# Patient Record
Sex: MALE | Race: WHITE | Employment: OTHER | URBAN - NONMETROPOLITAN AREA
[De-identification: names, ages, dates, MRNs, and addresses within clinical notes are randomized per-mention and may not be internally consistent; named-entity substitution may affect disease eponyms.]

---

## 2017-04-04 LAB
BASOPHILS ABSOLUTE: NORMAL /ΜL
BASOPHILS RELATIVE PERCENT: NORMAL %
BUN BLDV-MCNC: 22 MG/DL
CALCIUM SERPL-MCNC: 9.8 MG/DL
CHLORIDE BLD-SCNC: 103 MMOL/L
CO2: 31 MMOL/L
CREAT SERPL-MCNC: 1.4 MG/DL
EOSINOPHILS ABSOLUTE: NORMAL /ΜL
EOSINOPHILS RELATIVE PERCENT: NORMAL %
GFR CALCULATED: 51
GLUCOSE BLD-MCNC: 123 MG/DL
HCT VFR BLD CALC: 46.9 % (ref 41–53)
HEMOGLOBIN: 15.4 G/DL (ref 13.5–17.5)
LYMPHOCYTES ABSOLUTE: NORMAL /ΜL
LYMPHOCYTES RELATIVE PERCENT: NORMAL %
MCH RBC QN AUTO: NORMAL PG
MCHC RBC AUTO-ENTMCNC: NORMAL G/DL
MCV RBC AUTO: NORMAL FL
MONOCYTES ABSOLUTE: NORMAL /ΜL
MONOCYTES RELATIVE PERCENT: NORMAL %
NEUTROPHILS ABSOLUTE: NORMAL /ΜL
NEUTROPHILS RELATIVE PERCENT: NORMAL %
PLATELET # BLD: 154 K/ΜL
PMV BLD AUTO: NORMAL FL
POTASSIUM SERPL-SCNC: 4 MMOL/L
RBC # BLD: 5.03 10^6/ΜL
SODIUM BLD-SCNC: 142 MMOL/L
WBC # BLD: 5.78 10^3/ML

## 2017-04-11 ENCOUNTER — OFFICE VISIT (OUTPATIENT)
Dept: NEPHROLOGY | Age: 82
End: 2017-04-11

## 2017-04-11 VITALS
HEART RATE: 81 BPM | DIASTOLIC BLOOD PRESSURE: 68 MMHG | SYSTOLIC BLOOD PRESSURE: 128 MMHG | WEIGHT: 248 LBS | HEIGHT: 73 IN | OXYGEN SATURATION: 96 % | BODY MASS INDEX: 32.87 KG/M2 | RESPIRATION RATE: 18 BRPM

## 2017-04-11 DIAGNOSIS — N18.30 CKD (CHRONIC KIDNEY DISEASE) STAGE 3, GFR 30-59 ML/MIN (HCC): Primary | ICD-10-CM

## 2017-04-11 PROCEDURE — 4040F PNEUMOC VAC/ADMIN/RCVD: CPT | Performed by: INTERNAL MEDICINE

## 2017-04-11 PROCEDURE — 99213 OFFICE O/P EST LOW 20 MIN: CPT | Performed by: INTERNAL MEDICINE

## 2017-04-11 PROCEDURE — G8419 CALC BMI OUT NRM PARAM NOF/U: HCPCS | Performed by: INTERNAL MEDICINE

## 2017-04-11 PROCEDURE — 1036F TOBACCO NON-USER: CPT | Performed by: INTERNAL MEDICINE

## 2017-04-11 PROCEDURE — G8427 DOCREV CUR MEDS BY ELIG CLIN: HCPCS | Performed by: INTERNAL MEDICINE

## 2017-04-11 PROCEDURE — 1123F ACP DISCUSS/DSCN MKR DOCD: CPT | Performed by: INTERNAL MEDICINE

## 2017-04-11 RX ORDER — DESOXIMETASONE 2.5 MG/G
CREAM TOPICAL PRN
Refills: 1 | COMMUNITY
Start: 2017-01-24 | End: 2017-08-08 | Stop reason: ALTCHOICE

## 2017-04-11 RX ORDER — HYDROXYZINE HYDROCHLORIDE 10 MG/1
10 TABLET, FILM COATED ORAL DAILY
Refills: 5 | COMMUNITY
Start: 2017-03-20 | End: 2019-09-10 | Stop reason: ALTCHOICE

## 2017-04-11 RX ORDER — COVID-19 ANTIGEN TEST
KIT MISCELLANEOUS PRN
COMMUNITY
End: 2017-08-08 | Stop reason: ALTCHOICE

## 2017-07-31 LAB
BASOPHILS ABSOLUTE: NORMAL /ΜL
BASOPHILS RELATIVE PERCENT: NORMAL %
BUN BLDV-MCNC: 24 MG/DL
CALCIUM SERPL-MCNC: 10.2 MG/DL
CHLORIDE BLD-SCNC: 101 MMOL/L
CO2: 29 MMOL/L
CREAT SERPL-MCNC: 1.7 MG/DL
EOSINOPHILS ABSOLUTE: NORMAL /ΜL
EOSINOPHILS RELATIVE PERCENT: NORMAL %
GFR CALCULATED: 41
GLUCOSE BLD-MCNC: 143 MG/DL
HCT VFR BLD CALC: 45 % (ref 41–53)
HEMOGLOBIN: 14.8 G/DL (ref 13.5–17.5)
LYMPHOCYTES ABSOLUTE: NORMAL /ΜL
LYMPHOCYTES RELATIVE PERCENT: NORMAL %
MCH RBC QN AUTO: NORMAL PG
MCHC RBC AUTO-ENTMCNC: NORMAL G/DL
MCV RBC AUTO: NORMAL FL
MONOCYTES ABSOLUTE: NORMAL /ΜL
MONOCYTES RELATIVE PERCENT: NORMAL %
NEUTROPHILS ABSOLUTE: NORMAL /ΜL
NEUTROPHILS RELATIVE PERCENT: NORMAL %
PLATELET # BLD: 166 K/ΜL
PMV BLD AUTO: NORMAL FL
POTASSIUM SERPL-SCNC: 4.2 MMOL/L
RBC # BLD: 4.87 10^6/ΜL
SODIUM BLD-SCNC: 141 MMOL/L
WBC # BLD: 6.01 10^3/ML

## 2017-08-08 ENCOUNTER — OFFICE VISIT (OUTPATIENT)
Dept: NEPHROLOGY | Age: 82
End: 2017-08-08
Payer: MEDICARE

## 2017-08-08 VITALS
OXYGEN SATURATION: 94 % | WEIGHT: 247 LBS | HEIGHT: 73 IN | BODY MASS INDEX: 32.74 KG/M2 | DIASTOLIC BLOOD PRESSURE: 82 MMHG | HEART RATE: 82 BPM | RESPIRATION RATE: 18 BRPM | SYSTOLIC BLOOD PRESSURE: 122 MMHG

## 2017-08-08 DIAGNOSIS — N18.30 CKD (CHRONIC KIDNEY DISEASE), STAGE III (HCC): Primary | ICD-10-CM

## 2017-08-08 PROCEDURE — 1123F ACP DISCUSS/DSCN MKR DOCD: CPT | Performed by: INTERNAL MEDICINE

## 2017-08-08 PROCEDURE — G8417 CALC BMI ABV UP PARAM F/U: HCPCS | Performed by: INTERNAL MEDICINE

## 2017-08-08 PROCEDURE — G8427 DOCREV CUR MEDS BY ELIG CLIN: HCPCS | Performed by: INTERNAL MEDICINE

## 2017-08-08 PROCEDURE — 1036F TOBACCO NON-USER: CPT | Performed by: INTERNAL MEDICINE

## 2017-08-08 PROCEDURE — 99213 OFFICE O/P EST LOW 20 MIN: CPT | Performed by: INTERNAL MEDICINE

## 2017-08-08 PROCEDURE — 4040F PNEUMOC VAC/ADMIN/RCVD: CPT | Performed by: INTERNAL MEDICINE

## 2017-08-08 RX ORDER — TRAMADOL HYDROCHLORIDE 50 MG/1
TABLET ORAL
Refills: 0 | COMMUNITY
Start: 2017-06-08 | End: 2018-02-13 | Stop reason: ALTCHOICE

## 2017-10-02 LAB
BASOPHILS ABSOLUTE: NORMAL /ΜL
BASOPHILS RELATIVE PERCENT: NORMAL %
BUN BLDV-MCNC: 25 MG/DL
CALCIUM SERPL-MCNC: 9.5 MG/DL
CHLORIDE BLD-SCNC: 103 MMOL/L
CO2: 25 MMOL/L
CREAT SERPL-MCNC: 1.6 MG/DL
EOSINOPHILS ABSOLUTE: NORMAL /ΜL
EOSINOPHILS RELATIVE PERCENT: NORMAL %
GFR CALCULATED: 44
GLUCOSE BLD-MCNC: 112 MG/DL
HCT VFR BLD CALC: 46 % (ref 41–53)
HEMOGLOBIN: 15.3 G/DL (ref 13.5–17.5)
LYMPHOCYTES ABSOLUTE: NORMAL /ΜL
LYMPHOCYTES RELATIVE PERCENT: NORMAL %
MCH RBC QN AUTO: NORMAL PG
MCHC RBC AUTO-ENTMCNC: NORMAL G/DL
MCV RBC AUTO: NORMAL FL
MONOCYTES ABSOLUTE: NORMAL /ΜL
MONOCYTES RELATIVE PERCENT: NORMAL %
NEUTROPHILS ABSOLUTE: NORMAL /ΜL
NEUTROPHILS RELATIVE PERCENT: NORMAL %
PLATELET # BLD: 150 K/ΜL
PMV BLD AUTO: NORMAL FL
POTASSIUM SERPL-SCNC: 4.4 MMOL/L
RBC # BLD: 4.94 10^6/ΜL
SODIUM BLD-SCNC: 137 MMOL/L
WBC # BLD: 6.69 10^3/ML

## 2017-10-10 ENCOUNTER — OFFICE VISIT (OUTPATIENT)
Dept: NEPHROLOGY | Age: 82
End: 2017-10-10
Payer: MEDICARE

## 2017-10-10 VITALS
BODY MASS INDEX: 32.07 KG/M2 | DIASTOLIC BLOOD PRESSURE: 64 MMHG | WEIGHT: 242 LBS | HEART RATE: 96 BPM | SYSTOLIC BLOOD PRESSURE: 112 MMHG | HEIGHT: 73 IN | RESPIRATION RATE: 16 BRPM | OXYGEN SATURATION: 96 %

## 2017-10-10 DIAGNOSIS — N18.30 CKD (CHRONIC KIDNEY DISEASE), STAGE III (HCC): Primary | ICD-10-CM

## 2017-10-10 PROCEDURE — G8427 DOCREV CUR MEDS BY ELIG CLIN: HCPCS | Performed by: INTERNAL MEDICINE

## 2017-10-10 PROCEDURE — 99213 OFFICE O/P EST LOW 20 MIN: CPT | Performed by: INTERNAL MEDICINE

## 2017-10-10 PROCEDURE — 4040F PNEUMOC VAC/ADMIN/RCVD: CPT | Performed by: INTERNAL MEDICINE

## 2017-10-10 PROCEDURE — 1036F TOBACCO NON-USER: CPT | Performed by: INTERNAL MEDICINE

## 2017-10-10 PROCEDURE — G8484 FLU IMMUNIZE NO ADMIN: HCPCS | Performed by: INTERNAL MEDICINE

## 2017-10-10 PROCEDURE — G8417 CALC BMI ABV UP PARAM F/U: HCPCS | Performed by: INTERNAL MEDICINE

## 2017-10-10 PROCEDURE — 1123F ACP DISCUSS/DSCN MKR DOCD: CPT | Performed by: INTERNAL MEDICINE

## 2017-10-10 RX ORDER — TADALAFIL 10 MG/1
10 TABLET ORAL PRN
Qty: 10 TABLET | Refills: 12 | Status: SHIPPED | OUTPATIENT
Start: 2017-10-10 | End: 2018-05-08

## 2017-10-10 NOTE — PROGRESS NOTES
10/02/2017    HCT 46.0 10/02/2017     10/02/2017     BMP:    Lab Results   Component Value Date     10/02/2017     07/31/2017     04/04/2017    K 4.4 10/02/2017    K 4.2 07/31/2017    K 4.0 04/04/2017     10/02/2017     07/31/2017     04/04/2017    CO2 25 10/02/2017    CO2 29 07/31/2017    CO2 31 04/04/2017    BUN 25 10/02/2017    BUN 24 07/31/2017    BUN 22 04/04/2017    CREATININE 1.6 10/02/2017    CREATININE 1.7 07/31/2017    CREATININE 1.4 04/04/2017    GLUCOSE 112 10/02/2017    GLUCOSE 143 07/31/2017    GLUCOSE 123 04/04/2017      Hepatic:   Lab Results   Component Value Date    AST 23 09/08/2015    ALT 31 09/08/2015    BILITOT 1.4 09/08/2015    ALKPHOS 85 09/08/2015     BNP: No results found for: BNP  Lipids: No results found for: CHOL, HDL  INR: No results found for: INR  URINE: No results found for: NAUR, PROTUR  No results found for: NITRU, COLORU, PHUR, LABCAST, WBCUA, RBCUA, MUCUS, TRICHOMONAS, YEAST, BACTERIA, CLARITYU, SPECGRAV, LEUKOCYTESUR, UROBILINOGEN, BILIRUBINUR, BLOODU, GLUCOSEU, KETUA, AMORPHOUS   Microalbumen/Creatinine ratio:  No components found for: RUCREAT        Medications:    Current Outpatient Prescriptions   Medication Sig Dispense Refill    traMADol (ULTRAM) 50 MG tablet   0    hydrOXYzine (ATARAX) 10 MG tablet 10 mg daily  5    vitamin D-3 (CHOLECALCIFEROL) 5000 UNITS TABS Take 5,000 Units by mouth 10 pills a week      Multiple Vitamins-Minerals (PRESERVISION AREDS PO) Take  by mouth 2 times daily.  docusate sodium (COLACE) 100 MG capsule Take 100 mg by mouth 2 times daily. No current facility-administered medications for this visit. IMPRESSION:    Patient Active Problem List   Diagnosis Code    CKD (chronic kidney disease), stage III N18.3    Nephrotic syndrome N04.9    Proteinuria R80.9    Nephrolithiasis N20.0    Hyperlipemia E78.5    Eczema L30.9                PLAN:  1. We discussed the eGFR today.   2. We

## 2017-10-12 PROBLEM — N52.9 ERECTILE DYSFUNCTION: Status: ACTIVE | Noted: 2017-10-12

## 2017-10-19 ENCOUNTER — TELEPHONE (OUTPATIENT)
Dept: NEPHROLOGY | Age: 82
End: 2017-10-19

## 2018-01-29 LAB
BASOPHILS ABSOLUTE: NORMAL /ΜL
BASOPHILS RELATIVE PERCENT: NORMAL %
BUN BLDV-MCNC: 22 MG/DL
CALCIUM SERPL-MCNC: 9.8 MG/DL
CHLORIDE BLD-SCNC: 104 MMOL/L
CO2: 28 MMOL/L
CREAT SERPL-MCNC: 1.8 MG/DL
EOSINOPHILS ABSOLUTE: NORMAL /ΜL
EOSINOPHILS RELATIVE PERCENT: NORMAL %
GFR CALCULATED: 38
GLUCOSE BLD-MCNC: 121 MG/DL
HCT VFR BLD CALC: 44.1 % (ref 41–53)
HEMOGLOBIN: 14.4 G/DL (ref 13.5–17.5)
LYMPHOCYTES ABSOLUTE: NORMAL /ΜL
LYMPHOCYTES RELATIVE PERCENT: NORMAL %
MCH RBC QN AUTO: NORMAL PG
MCHC RBC AUTO-ENTMCNC: NORMAL G/DL
MCV RBC AUTO: NORMAL FL
MONOCYTES ABSOLUTE: NORMAL /ΜL
MONOCYTES RELATIVE PERCENT: NORMAL %
NEUTROPHILS ABSOLUTE: NORMAL /ΜL
NEUTROPHILS RELATIVE PERCENT: NORMAL %
PLATELET # BLD: 164 K/ΜL
PMV BLD AUTO: NORMAL FL
POTASSIUM SERPL-SCNC: 4.3 MMOL/L
RBC # BLD: 4.73 10^6/ΜL
SODIUM BLD-SCNC: 139 MMOL/L
WBC # BLD: 5.29 10^3/ML

## 2018-02-13 ENCOUNTER — OFFICE VISIT (OUTPATIENT)
Dept: NEPHROLOGY | Age: 83
End: 2018-02-13
Payer: MEDICARE

## 2018-02-13 VITALS
RESPIRATION RATE: 18 BRPM | DIASTOLIC BLOOD PRESSURE: 74 MMHG | HEART RATE: 83 BPM | BODY MASS INDEX: 32.74 KG/M2 | SYSTOLIC BLOOD PRESSURE: 128 MMHG | OXYGEN SATURATION: 98 % | HEIGHT: 73 IN | WEIGHT: 247 LBS

## 2018-02-13 DIAGNOSIS — N17.9 AKI (ACUTE KIDNEY INJURY) (HCC): Primary | ICD-10-CM

## 2018-02-13 DIAGNOSIS — N18.30 CKD (CHRONIC KIDNEY DISEASE), STAGE III (HCC): ICD-10-CM

## 2018-02-13 PROCEDURE — G8427 DOCREV CUR MEDS BY ELIG CLIN: HCPCS | Performed by: INTERNAL MEDICINE

## 2018-02-13 PROCEDURE — 4040F PNEUMOC VAC/ADMIN/RCVD: CPT | Performed by: INTERNAL MEDICINE

## 2018-02-13 PROCEDURE — 1123F ACP DISCUSS/DSCN MKR DOCD: CPT | Performed by: INTERNAL MEDICINE

## 2018-02-13 PROCEDURE — G8417 CALC BMI ABV UP PARAM F/U: HCPCS | Performed by: INTERNAL MEDICINE

## 2018-02-13 PROCEDURE — G8484 FLU IMMUNIZE NO ADMIN: HCPCS | Performed by: INTERNAL MEDICINE

## 2018-02-13 PROCEDURE — 1036F TOBACCO NON-USER: CPT | Performed by: INTERNAL MEDICINE

## 2018-02-13 PROCEDURE — 99213 OFFICE O/P EST LOW 20 MIN: CPT | Performed by: INTERNAL MEDICINE

## 2018-02-13 NOTE — PROGRESS NOTES
HCT 44.1 01/29/2018     01/29/2018     BMP:    Lab Results   Component Value Date     01/29/2018     10/02/2017     07/31/2017    K 4.3 01/29/2018    K 4.4 10/02/2017    K 4.2 07/31/2017     01/29/2018     10/02/2017     07/31/2017    CO2 28 01/29/2018    CO2 25 10/02/2017    CO2 29 07/31/2017    BUN 22 01/29/2018    BUN 25 10/02/2017    BUN 24 07/31/2017    CREATININE 1.8 01/29/2018    CREATININE 1.6 10/02/2017    CREATININE 1.7 07/31/2017    GLUCOSE 121 01/29/2018    GLUCOSE 112 10/02/2017    GLUCOSE 143 07/31/2017      Hepatic:   Lab Results   Component Value Date    AST 23 09/08/2015    ALT 31 09/08/2015    BILITOT 1.4 09/08/2015    ALKPHOS 85 09/08/2015     BNP: No results found for: BNP  Lipids: No results found for: CHOL, HDL  INR: No results found for: INR  URINE: No results found for: NAUR, PROTUR  No results found for: NITRU, COLORU, PHUR, LABCAST, WBCUA, RBCUA, MUCUS, TRICHOMONAS, YEAST, BACTERIA, CLARITYU, SPECGRAV, LEUKOCYTESUR, UROBILINOGEN, BILIRUBINUR, BLOODU, GLUCOSEU, KETUA, AMORPHOUS   Microalbumen/Creatinine ratio:  No components found for: RUCREAT        Medications:    Current Outpatient Prescriptions   Medication Sig Dispense Refill    Naproxen Sodium (ALEVE PO) Take by mouth as needed      tadalafil (CIALIS) 10 MG tablet Take 1 tablet by mouth as needed for Erectile Dysfunction 10 tablet 12    hydrOXYzine (ATARAX) 10 MG tablet 10 mg daily  5    vitamin D-3 (CHOLECALCIFEROL) 5000 UNITS TABS Take 5,000 Units by mouth 10 pills a week      Multiple Vitamins-Minerals (PRESERVISION AREDS PO) Take  by mouth 2 times daily.  docusate sodium (COLACE) 100 MG capsule Take 100 mg by mouth 2 times daily. No current facility-administered medications for this visit. IMPRESSIONS:      1. Acute kidney injury. 2. CKD stage IIIB from microkidney stones  3. Nephrotic syndrome         PLAN:  1. We discussed the eGFR today.   2. We will continue

## 2018-04-30 LAB
BASOPHILS ABSOLUTE: NORMAL /ΜL
BASOPHILS RELATIVE PERCENT: NORMAL %
BUN BLDV-MCNC: 30 MG/DL
CALCIUM SERPL-MCNC: 9.7 MG/DL
CHLORIDE BLD-SCNC: 104 MMOL/L
CO2: 30 MMOL/L
CREAT SERPL-MCNC: 106 MG/DL
EOSINOPHILS ABSOLUTE: NORMAL /ΜL
EOSINOPHILS RELATIVE PERCENT: NORMAL %
GFR CALCULATED: 44
GLUCOSE BLD-MCNC: 116 MG/DL
HCT VFR BLD CALC: 44.6 % (ref 41–53)
HEMOGLOBIN: 14.9 G/DL (ref 13.5–17.5)
LYMPHOCYTES ABSOLUTE: NORMAL /ΜL
LYMPHOCYTES RELATIVE PERCENT: NORMAL %
MCH RBC QN AUTO: NORMAL PG
MCHC RBC AUTO-ENTMCNC: NORMAL G/DL
MCV RBC AUTO: NORMAL FL
MONOCYTES ABSOLUTE: NORMAL /ΜL
MONOCYTES RELATIVE PERCENT: NORMAL %
NEUTROPHILS ABSOLUTE: NORMAL /ΜL
NEUTROPHILS RELATIVE PERCENT: NORMAL %
PLATELET # BLD: 152 K/ΜL
PMV BLD AUTO: NORMAL FL
POTASSIUM SERPL-SCNC: 4.3 MMOL/L
RBC # BLD: 4.81 10^6/ΜL
SODIUM BLD-SCNC: 141 MMOL/L
WBC # BLD: 5.78 10^3/ML

## 2018-05-08 ENCOUNTER — OFFICE VISIT (OUTPATIENT)
Dept: NEPHROLOGY | Age: 83
End: 2018-05-08
Payer: MEDICARE

## 2018-05-08 VITALS
DIASTOLIC BLOOD PRESSURE: 64 MMHG | OXYGEN SATURATION: 97 % | WEIGHT: 248 LBS | HEIGHT: 73 IN | SYSTOLIC BLOOD PRESSURE: 122 MMHG | BODY MASS INDEX: 32.87 KG/M2 | HEART RATE: 80 BPM | RESPIRATION RATE: 18 BRPM

## 2018-05-08 DIAGNOSIS — N18.30 CKD (CHRONIC KIDNEY DISEASE), STAGE III (HCC): Primary | ICD-10-CM

## 2018-05-08 PROCEDURE — G8427 DOCREV CUR MEDS BY ELIG CLIN: HCPCS | Performed by: INTERNAL MEDICINE

## 2018-05-08 PROCEDURE — 1036F TOBACCO NON-USER: CPT | Performed by: INTERNAL MEDICINE

## 2018-05-08 PROCEDURE — 4040F PNEUMOC VAC/ADMIN/RCVD: CPT | Performed by: INTERNAL MEDICINE

## 2018-05-08 PROCEDURE — 1123F ACP DISCUSS/DSCN MKR DOCD: CPT | Performed by: INTERNAL MEDICINE

## 2018-05-08 PROCEDURE — 99213 OFFICE O/P EST LOW 20 MIN: CPT | Performed by: INTERNAL MEDICINE

## 2018-05-08 PROCEDURE — G8417 CALC BMI ABV UP PARAM F/U: HCPCS | Performed by: INTERNAL MEDICINE

## 2018-05-08 RX ORDER — DESOXIMETASONE 2.5 MG/G
CREAM TOPICAL
Refills: 2 | COMMUNITY
Start: 2018-04-16 | End: 2021-04-06

## 2018-11-05 LAB
BASOPHILS ABSOLUTE: ABNORMAL /ΜL
BASOPHILS RELATIVE PERCENT: ABNORMAL %
BUN BLDV-MCNC: 21 MG/DL
CALCIUM SERPL-MCNC: 9.4 MG/DL
CHLORIDE BLD-SCNC: 100 MMOL/L
CO2: 27 MMOL/L
CREAT SERPL-MCNC: 1.3 MG/DL
EOSINOPHILS ABSOLUTE: ABNORMAL /ΜL
EOSINOPHILS RELATIVE PERCENT: ABNORMAL %
GFR CALCULATED: 56
GLUCOSE BLD-MCNC: 123 MG/DL
HCT VFR BLD CALC: 40.1 % (ref 41–53)
HEMOGLOBIN: 13.3 G/DL (ref 13.5–17.5)
LYMPHOCYTES ABSOLUTE: ABNORMAL /ΜL
LYMPHOCYTES RELATIVE PERCENT: ABNORMAL %
MCH RBC QN AUTO: ABNORMAL PG
MCHC RBC AUTO-ENTMCNC: ABNORMAL G/DL
MCV RBC AUTO: ABNORMAL FL
MONOCYTES ABSOLUTE: ABNORMAL /ΜL
MONOCYTES RELATIVE PERCENT: ABNORMAL %
NEUTROPHILS ABSOLUTE: ABNORMAL /ΜL
NEUTROPHILS RELATIVE PERCENT: ABNORMAL %
PLATELET # BLD: 161 K/ΜL
PMV BLD AUTO: ABNORMAL FL
POTASSIUM SERPL-SCNC: 3.9 MMOL/L
RBC # BLD: 4.29 10^6/ΜL
SODIUM BLD-SCNC: 137 MMOL/L
WBC # BLD: 9.69 10^3/ML

## 2018-11-13 ENCOUNTER — OFFICE VISIT (OUTPATIENT)
Dept: NEPHROLOGY | Age: 83
End: 2018-11-13
Payer: MEDICARE

## 2018-11-13 VITALS
SYSTOLIC BLOOD PRESSURE: 138 MMHG | HEART RATE: 81 BPM | DIASTOLIC BLOOD PRESSURE: 72 MMHG | HEIGHT: 73 IN | BODY MASS INDEX: 31.54 KG/M2 | RESPIRATION RATE: 18 BRPM | WEIGHT: 238 LBS | OXYGEN SATURATION: 96 %

## 2018-11-13 DIAGNOSIS — I25.10 CORONARY ARTERY DISEASE INVOLVING NATIVE CORONARY ARTERY OF NATIVE HEART WITHOUT ANGINA PECTORIS: ICD-10-CM

## 2018-11-13 DIAGNOSIS — Z95.5 HISTORY OF HEART ARTERY STENT: ICD-10-CM

## 2018-11-13 DIAGNOSIS — N18.30 CKD (CHRONIC KIDNEY DISEASE), STAGE III (HCC): Primary | ICD-10-CM

## 2018-11-13 PROCEDURE — G8417 CALC BMI ABV UP PARAM F/U: HCPCS | Performed by: INTERNAL MEDICINE

## 2018-11-13 PROCEDURE — 1101F PT FALLS ASSESS-DOCD LE1/YR: CPT | Performed by: INTERNAL MEDICINE

## 2018-11-13 PROCEDURE — 1036F TOBACCO NON-USER: CPT | Performed by: INTERNAL MEDICINE

## 2018-11-13 PROCEDURE — G8427 DOCREV CUR MEDS BY ELIG CLIN: HCPCS | Performed by: INTERNAL MEDICINE

## 2018-11-13 PROCEDURE — 99213 OFFICE O/P EST LOW 20 MIN: CPT | Performed by: INTERNAL MEDICINE

## 2018-11-13 PROCEDURE — 4040F PNEUMOC VAC/ADMIN/RCVD: CPT | Performed by: INTERNAL MEDICINE

## 2018-11-13 PROCEDURE — 1123F ACP DISCUSS/DSCN MKR DOCD: CPT | Performed by: INTERNAL MEDICINE

## 2018-11-13 PROCEDURE — G8598 ASA/ANTIPLAT THER USED: HCPCS | Performed by: INTERNAL MEDICINE

## 2018-11-13 PROCEDURE — G8484 FLU IMMUNIZE NO ADMIN: HCPCS | Performed by: INTERNAL MEDICINE

## 2018-11-13 RX ORDER — DOCUSATE SODIUM 100 MG/1
100 CAPSULE, LIQUID FILLED ORAL DAILY
COMMUNITY

## 2018-11-13 RX ORDER — ATORVASTATIN CALCIUM 40 MG/1
40 TABLET, FILM COATED ORAL DAILY
COMMUNITY

## 2018-11-13 RX ORDER — ISOSORBIDE MONONITRATE 30 MG/1
30 TABLET, EXTENDED RELEASE ORAL DAILY
Refills: 6 | COMMUNITY
Start: 2018-10-28 | End: 2019-09-10 | Stop reason: ALTCHOICE

## 2018-11-13 RX ORDER — TICAGRELOR 90 MG/1
90 TABLET ORAL 2 TIMES DAILY
COMMUNITY
Start: 2018-11-10 | End: 2021-04-06

## 2018-11-13 RX ORDER — NITROGLYCERIN 0.4 MG/1
TABLET SUBLINGUAL
COMMUNITY
Start: 2018-09-18

## 2018-11-13 NOTE — PROGRESS NOTES
[facility]    [address]  [address], [address]  [phone]       Progress Note    11/13/2018 9:47 AM    Pt Name:    Jillian Flores  YOB: 1931  Primary Care Physician:  Taiwo Bertrand MD       Chief Complaint:   Chief Complaint   Patient presents with    Chronic Kidney Disease     iii        History of Chief Complaint: CKD stage IIIA  from nephrotic syndrome and nephrolithiasis      Subjective:  I last saw the patient in clinic 05/08/18. I follow the patient for Chronic Kidney disease stage IIIA. Since our last visit the patient has been hospitalized at [facility] for cardiac stents by Dr. Brannon Milton. The patient is sleeping well at night with 1-2 times per night nocturia. The patient has a good appetite and is remaining active. The patient denied N/V/C/D/SOB/CP. Last six eGFR readings:  Lab Results   Component Value Date    LABGLOM 56 11/05/2018    LABGLOM 44 04/30/2018    LABGLOM 38 01/29/2018    LABGLOM 44 10/02/2017    LABGLOM 41 07/31/2017    LABGLOM 51 04/04/2017          Objective:  VITALS:  /72 (Site: Left Upper Arm, Position: Sitting, Cuff Size: Large Adult)   Pulse 81   Ht 6' 1\" (1.854 m)   Wt 238 lb (108 kg)   SpO2 96%   BMI 31.40 kg/m²   Weight:   Wt Readings from Last 3 Encounters:   11/13/18 238 lb (108 kg)   05/08/18 248 lb (112.5 kg)   02/13/18 247 lb (112 kg)     Body mass index is 31.4 kg/m². Physical examination    General:  Alert and cooperative with exam  HEENT:  Head: Normocephalic, no lesions, without obvious abnormality. Neck:   No JVD and no bruits.  Thyroid gland is normal  Lungs:  clear to auscultation bilaterally  Heart:  regular rate and rhythm, S1, S2 normal, no murmur, click, rub or gallop  Abdomen:  soft, non-tender; bowel sounds normal; no masses,  no organomegaly  Extremities:  extremities normal, atraumatic, no cyanosis or edema  Neurologic:  Mental status: Alert, oriented, thought content appropriate  Skin: nitroGLYCERIN (NITROSTAT) 0.4 MG SL tablet       IBUPROFEN PO Take by mouth as needed      Insulin Pen Needle 31G X 8 MM MISC 1 each by Does not apply route daily 100 each 3    exenatide (BYETTA 10 MCG PEN) 10 MCG/0.04ML injection Inject 0.02 mLs into the skin 2 times daily (with meals) 2.4 mL 3    desoximetasone (TOPICORT) 0.25 % cream   2    hydrOXYzine (ATARAX) 10 MG tablet 10 mg daily  5    vitamin D-3 (CHOLECALCIFEROL) 5000 UNITS TABS Take 5,000 Units by mouth 10 pills a week      Multiple Vitamins-Minerals (PRESERVISION AREDS PO) Take  by mouth 2 times daily. No current facility-administered medications for this visit. IMPRESSIONS:      Kidney disease: CKD stage II with improved function. CAD  Anemia: Stable  Bone and mineral metabolism:       PLAN:  1. We discussed the eGFR today. 2. We will continue all current medications without changes. 3. We will see the patient back in 6 months.               _________________________________  Sirena Hamilton.  Amandeep Berman DO  [facility]      Lovely Connor MD

## 2019-04-22 LAB
BASOPHILS ABSOLUTE: ABNORMAL /ΜL
BASOPHILS RELATIVE PERCENT: ABNORMAL %
BUN BLDV-MCNC: 28 MG/DL
CALCIUM SERPL-MCNC: 9.3 MG/DL
CHLORIDE BLD-SCNC: 105 MMOL/L
CO2: 23 MMOL/L
CREAT SERPL-MCNC: 1.6 MG/DL
EOSINOPHILS ABSOLUTE: ABNORMAL /ΜL
EOSINOPHILS RELATIVE PERCENT: ABNORMAL %
GFR CALCULATED: 44
GLUCOSE BLD-MCNC: 95 MG/DL
HCT VFR BLD CALC: 38.7 % (ref 41–53)
HEMOGLOBIN: 12.4 G/DL (ref 13.5–17.5)
LYMPHOCYTES ABSOLUTE: ABNORMAL /ΜL
LYMPHOCYTES RELATIVE PERCENT: ABNORMAL %
MCH RBC QN AUTO: ABNORMAL PG
MCHC RBC AUTO-ENTMCNC: ABNORMAL G/DL
MCV RBC AUTO: ABNORMAL FL
MONOCYTES ABSOLUTE: ABNORMAL /ΜL
MONOCYTES RELATIVE PERCENT: ABNORMAL %
NEUTROPHILS ABSOLUTE: ABNORMAL /ΜL
NEUTROPHILS RELATIVE PERCENT: ABNORMAL %
PLATELET # BLD: 132 K/ΜL
PMV BLD AUTO: ABNORMAL FL
POTASSIUM SERPL-SCNC: 4.5 MMOL/L
RBC # BLD: 4.09 10^6/ΜL
SODIUM BLD-SCNC: 139 MMOL/L
WBC # BLD: 6.96 10^3/ML

## 2019-05-14 ENCOUNTER — OFFICE VISIT (OUTPATIENT)
Dept: NEPHROLOGY | Age: 84
End: 2019-05-14
Payer: MEDICARE

## 2019-05-14 VITALS
SYSTOLIC BLOOD PRESSURE: 128 MMHG | HEIGHT: 73 IN | DIASTOLIC BLOOD PRESSURE: 82 MMHG | HEART RATE: 62 BPM | BODY MASS INDEX: 26.24 KG/M2 | OXYGEN SATURATION: 98 % | WEIGHT: 198 LBS | RESPIRATION RATE: 18 BRPM

## 2019-05-14 DIAGNOSIS — N18.30 CHRONIC KIDNEY DISEASE, STAGE III (MODERATE) (HCC): ICD-10-CM

## 2019-05-14 PROCEDURE — G8417 CALC BMI ABV UP PARAM F/U: HCPCS | Performed by: INTERNAL MEDICINE

## 2019-05-14 PROCEDURE — 1123F ACP DISCUSS/DSCN MKR DOCD: CPT | Performed by: INTERNAL MEDICINE

## 2019-05-14 PROCEDURE — 99213 OFFICE O/P EST LOW 20 MIN: CPT | Performed by: INTERNAL MEDICINE

## 2019-05-14 PROCEDURE — 4040F PNEUMOC VAC/ADMIN/RCVD: CPT | Performed by: INTERNAL MEDICINE

## 2019-05-14 PROCEDURE — G8598 ASA/ANTIPLAT THER USED: HCPCS | Performed by: INTERNAL MEDICINE

## 2019-05-14 PROCEDURE — 1036F TOBACCO NON-USER: CPT | Performed by: INTERNAL MEDICINE

## 2019-05-14 PROCEDURE — G8427 DOCREV CUR MEDS BY ELIG CLIN: HCPCS | Performed by: INTERNAL MEDICINE

## 2019-05-14 RX ORDER — LANOLIN ALCOHOL/MO/W.PET/CERES
3 CREAM (GRAM) TOPICAL DAILY
COMMUNITY
End: 2020-05-26

## 2019-05-14 RX ORDER — SENNA PLUS 8.6 MG/1
1 TABLET ORAL DAILY
COMMUNITY

## 2019-05-14 RX ORDER — AMIODARONE HYDROCHLORIDE 200 MG/1
200 TABLET ORAL DAILY
COMMUNITY
End: 2020-05-26 | Stop reason: ALTCHOICE

## 2019-05-14 RX ORDER — MIDODRINE HYDROCHLORIDE 10 MG/1
10 TABLET ORAL 3 TIMES DAILY
COMMUNITY

## 2019-05-14 RX ORDER — QUETIAPINE FUMARATE 25 MG/1
12.5 TABLET, FILM COATED ORAL 2 TIMES DAILY
COMMUNITY
Start: 2019-03-14 | End: 2019-09-10 | Stop reason: ALTCHOICE

## 2019-05-14 RX ORDER — TAMSULOSIN HYDROCHLORIDE 0.4 MG/1
0.4 CAPSULE ORAL DAILY
COMMUNITY
End: 2022-07-12 | Stop reason: ALTCHOICE

## 2019-05-14 NOTE — PROGRESS NOTES
[facility]    [address] [facility] [address]  [address], [address]  [phone]       Progress Note    5/14/2019 10:16 AM    Pt Name:    Amy Harris  YOB: 1931  Primary Care Physician:  China Diaz MD       Chief Complaint:   Chief Complaint   Patient presents with    Chronic Kidney Disease     iii    Hypertension        History of Chief Complaint: CKD stage IIIA  from nephrotic syndrome and nephrolithiasis      Subjective:  I last saw the patient in clinic 11/13/18. I follow the patient for Chronic Kidney disease stage IIIA. Since our last visit the patient has not been hospitalized. The patient is sleeping well at night with 1-2 times per night nocturia. The patient has a good appetite and is remaining active. The patient denied N/V/C/D/SOB/CP. There is a desire to start him on donepezil. Last six eGFR readings:  Lab Results   Component Value Date    LABGLOM 44 04/22/2019    LABGLOM 56 11/05/2018    LABGLOM 44 04/30/2018    LABGLOM 38 01/29/2018    LABGLOM 44 10/02/2017    LABGLOM 41 07/31/2017          Objective:  VITALS:  /82 (Site: Left Upper Arm, Position: Sitting, Cuff Size: Small Adult)   Pulse 62   Resp 18   Ht 6' 1\" (1.854 m)   Wt 198 lb (89.8 kg)   SpO2 98%   BMI 26.12 kg/m²   Weight:   Wt Readings from Last 3 Encounters:   05/14/19 198 lb (89.8 kg)   11/13/18 238 lb (108 kg)   05/08/18 248 lb (112.5 kg)     Body mass index is 26.12 kg/m². Physical examination    General:  Alert and cooperative with exam  HEENT:  Normocephalic. No lesions nor rashes. JERO. EOMI  Neck:   No JVD and no bruits. Thyroid gland is normal  Lungs:  Breathing easily. No rales nor rhonchi. No cough nor sputum production. Heart[de-identified]            RRR. No murmurs nor rubs. PMI is not enlarged nor displaced. Abdomen:  Soft and non tender. Bowel sounds are active in all four quadrants. Extremities:  No edema  Neurologic:  CN II-XII are intact. No deficits noted. Muscle strength and tone are equal throughout. Skin:                Warm and dry with no rashes. Muscles:         Hand  and leg strength are equal and strong bilaterally.      Lab Data      CBC:   Lab Results   Component Value Date    WBC 6.96 04/22/2019    HGB 12.4 (A) 04/22/2019    HCT 38.7 (A) 04/22/2019     04/22/2019     BMP:    Lab Results   Component Value Date     04/22/2019     11/05/2018     04/30/2018    K 4.5 04/22/2019    K 3.9 11/05/2018    K 4.3 04/30/2018     04/22/2019     11/05/2018     04/30/2018    CO2 23 04/22/2019    CO2 27 11/05/2018    CO2 30 04/30/2018    BUN 28 04/22/2019    BUN 21 11/05/2018    BUN 30 04/30/2018    CREATININE 1.6 04/22/2019    CREATININE 1.3 11/05/2018    CREATININE 106 04/30/2018    GLUCOSE 95 04/22/2019    GLUCOSE 123 11/05/2018    GLUCOSE 116 04/30/2018      Hepatic:   Lab Results   Component Value Date    AST 23 09/08/2015    ALT 31 09/08/2015    BILITOT 1.4 09/08/2015    ALKPHOS 85 09/08/2015     BNP: No results found for: BNP  Lipids: No results found for: CHOL, HDL  INR: No results found for: INR  URINE: No results found for: NAUR, PROTUR  No results found for: NITRU, COLORU, PHUR, LABCAST, WBCUA, RBCUA, MUCUS, TRICHOMONAS, YEAST, BACTERIA, CLARITYU, SPECGRAV, LEUKOCYTESUR, UROBILINOGEN, BILIRUBINUR, BLOODU, GLUCOSEU, KETUA, AMORPHOUS   Microalbumen/Creatinine ratio:  No components found for: RUCREAT        Medications:    Current Outpatient Medications   Medication Sig Dispense Refill    midodrine (PROAMATINE) 10 MG tablet Take 10 mg by mouth 3 times daily      amiodarone (CORDARONE) 200 MG tablet Take 200 mg by mouth daily      tamsulosin (FLOMAX) 0.4 MG capsule Take 0.4 mg by mouth daily      QUEtiapine (SEROQUEL) 25 MG tablet 12.5 mg 2 times daily      melatonin 3 MG TABS tablet Take 3 mg by mouth daily      senna (SENOKOT) 8.6 MG tablet Take 1 tablet by mouth daily      docusate sodium (COLACE) 100 MG capsule Take 100 mg by mouth 2 times daily      atorvastatin (LIPITOR) 40 MG tablet Take 40 mg by mouth daily      aspirin 81 MG tablet Take 81 mg by mouth daily      metoprolol tartrate (LOPRESSOR) 25 MG tablet 12.5 mg daily   0    BRILINTA 90 MG TABS tablet 90 mg 2 times daily      IBUPROFEN PO Take by mouth as needed      vitamin D-3 (CHOLECALCIFEROL) 5000 UNITS TABS Take 5,000 Units by mouth 10 pills a week      Multiple Vitamins-Minerals (PRESERVISION AREDS PO) Take  by mouth 2 times daily.  Sodium Bicarbonate-Citric Acid (JUAN MIGUEL-SELTZER HEARTBURN PO) Take by mouth      isosorbide mononitrate (IMDUR) 30 MG extended release tablet 30 mg daily  6    nitroGLYCERIN (NITROSTAT) 0.4 MG SL tablet       desoximetasone (TOPICORT) 0.25 % cream   2    hydrOXYzine (ATARAX) 10 MG tablet 10 mg daily  5     No current facility-administered medications for this visit. IMPRESSIONS:  Quality & Risk Score Accuracy    Visit Dx:  N18.3 - Chronic kidney disease, stage III (moderate) ([facility])  Assessment and plan:  Stable based upon symptoms and exam. Continue current treatment plan and follow up at least yearly. Last edited 05/14/19 10:16 EDT by Dimitri Fritz DO           Kidney disease: CKD stage IIIB with stable function  Anemia: stable. No need for DIANE currently  Bone and mineral metabolism:       PLAN:  1. We discussed the eGFR today. 2. We will continue all current medications without changes. 3. I am OK with starting Aricept  4. We will see the patient back in 4 months.               _________________________________  Madhavi Turcios.  Murray Coronado DO  [facility]      Ranjit Manley MD

## 2019-09-03 LAB
BASOPHILS ABSOLUTE: NORMAL /ΜL
BASOPHILS RELATIVE PERCENT: NORMAL %
BUN BLDV-MCNC: 29 MG/DL
CALCIUM SERPL-MCNC: 9.3 MG/DL
CHLORIDE BLD-SCNC: 106 MMOL/L
CO2: 29 MMOL/L
CREAT SERPL-MCNC: 1.6 MG/DL
EOSINOPHILS ABSOLUTE: NORMAL /ΜL
EOSINOPHILS RELATIVE PERCENT: NORMAL %
GFR CALCULATED: 44
GLUCOSE BLD-MCNC: 76 MG/DL
HCT VFR BLD CALC: 42 % (ref 41–53)
HEMOGLOBIN: 13.7 G/DL (ref 13.5–17.5)
LYMPHOCYTES ABSOLUTE: NORMAL /ΜL
LYMPHOCYTES RELATIVE PERCENT: NORMAL %
MCH RBC QN AUTO: NORMAL PG
MCHC RBC AUTO-ENTMCNC: NORMAL G/DL
MCV RBC AUTO: NORMAL FL
MONOCYTES ABSOLUTE: NORMAL /ΜL
MONOCYTES RELATIVE PERCENT: NORMAL %
NEUTROPHILS ABSOLUTE: NORMAL /ΜL
NEUTROPHILS RELATIVE PERCENT: NORMAL %
PLATELET # BLD: 125 K/ΜL
PMV BLD AUTO: NORMAL FL
POTASSIUM SERPL-SCNC: 4 MMOL/L
RBC # BLD: 4.42 10^6/ΜL
SODIUM BLD-SCNC: 142 MMOL/L
WBC # BLD: 4.96 10^3/ML

## 2019-09-10 ENCOUNTER — OFFICE VISIT (OUTPATIENT)
Dept: NEPHROLOGY | Age: 84
End: 2019-09-10
Payer: MEDICARE

## 2019-09-10 VITALS
WEIGHT: 206 LBS | SYSTOLIC BLOOD PRESSURE: 78 MMHG | OXYGEN SATURATION: 98 % | RESPIRATION RATE: 18 BRPM | HEART RATE: 56 BPM | HEIGHT: 73 IN | DIASTOLIC BLOOD PRESSURE: 42 MMHG | BODY MASS INDEX: 27.3 KG/M2

## 2019-09-10 DIAGNOSIS — N18.30 CKD (CHRONIC KIDNEY DISEASE), STAGE III (HCC): Primary | ICD-10-CM

## 2019-09-10 PROCEDURE — G8427 DOCREV CUR MEDS BY ELIG CLIN: HCPCS | Performed by: INTERNAL MEDICINE

## 2019-09-10 PROCEDURE — G8417 CALC BMI ABV UP PARAM F/U: HCPCS | Performed by: INTERNAL MEDICINE

## 2019-09-10 PROCEDURE — 99213 OFFICE O/P EST LOW 20 MIN: CPT | Performed by: INTERNAL MEDICINE

## 2019-09-10 PROCEDURE — 1123F ACP DISCUSS/DSCN MKR DOCD: CPT | Performed by: INTERNAL MEDICINE

## 2019-09-10 PROCEDURE — 4040F PNEUMOC VAC/ADMIN/RCVD: CPT | Performed by: INTERNAL MEDICINE

## 2019-09-10 PROCEDURE — G8598 ASA/ANTIPLAT THER USED: HCPCS | Performed by: INTERNAL MEDICINE

## 2019-09-10 PROCEDURE — 1036F TOBACCO NON-USER: CPT | Performed by: INTERNAL MEDICINE

## 2019-09-10 NOTE — PROGRESS NOTES
noted. Muscle strength and tone are equal throughout. Skin:                Warm and dry with no rashes. Muscles:         Hand  and leg strength are equal and strong bilaterally.      Lab Data      CBC:   Lab Results   Component Value Date    WBC 4.96 09/03/2019    HGB 13.7 09/03/2019    HCT 42.0 09/03/2019     09/03/2019     BMP:    Lab Results   Component Value Date     09/03/2019     04/22/2019     11/05/2018    K 4.0 09/03/2019    K 4.5 04/22/2019    K 3.9 11/05/2018     09/03/2019     04/22/2019     11/05/2018    CO2 29 09/03/2019    CO2 23 04/22/2019    CO2 27 11/05/2018    BUN 29 09/03/2019    BUN 28 04/22/2019    BUN 21 11/05/2018    CREATININE 1.6 09/03/2019    CREATININE 1.6 04/22/2019    CREATININE 1.3 11/05/2018    GLUCOSE 76 09/03/2019    GLUCOSE 95 04/22/2019    GLUCOSE 123 11/05/2018      Hepatic:   Lab Results   Component Value Date    AST 23 09/08/2015    ALT 31 09/08/2015    BILITOT 1.4 09/08/2015    ALKPHOS 85 09/08/2015     BNP: No results found for: BNP  Lipids: No results found for: CHOL, HDL  INR: No results found for: INR  URINE: No results found for: NAUR, PROTUR  No results found for: NITRU, COLORU, PHUR, LABCAST, WBCUA, RBCUA, MUCUS, TRICHOMONAS, YEAST, BACTERIA, CLARITYU, SPECGRAV, LEUKOCYTESUR, UROBILINOGEN, BILIRUBINUR, BLOODU, GLUCOSEU, KETUA, AMORPHOUS   Microalbumen/Creatinine ratio:  No components found for: RUCREAT        Medications:    Current Outpatient Medications   Medication Sig Dispense Refill    midodrine (PROAMATINE) 10 MG tablet Take 10 mg by mouth 3 times daily      amiodarone (CORDARONE) 200 MG tablet Take 200 mg by mouth daily      tamsulosin (FLOMAX) 0.4 MG capsule Take 0.4 mg by mouth daily      melatonin 3 MG TABS tablet Take 3 mg by mouth daily      senna (SENOKOT) 8.6 MG tablet Take 1 tablet by mouth daily      docusate sodium (COLACE) 100 MG capsule Take 100 mg by mouth 2 times daily      atorvastatin (LIPITOR) 40 MG tablet Take 40 mg by mouth daily      aspirin 81 MG tablet Take 81 mg by mouth daily      Sodium Bicarbonate-Citric Acid (JUAN MIGUEL-SELTZER HEARTBURN PO) Take by mouth      metoprolol tartrate (LOPRESSOR) 25 MG tablet 12.5 mg daily   0    BRILINTA 90 MG TABS tablet 90 mg 2 times daily      nitroGLYCERIN (NITROSTAT) 0.4 MG SL tablet       IBUPROFEN PO Take by mouth as needed      desoximetasone (TOPICORT) 0.25 % cream   2    vitamin D-3 (CHOLECALCIFEROL) 5000 UNITS TABS Take 5,000 Units by mouth 10 pills a week      Multiple Vitamins-Minerals (PRESERVISION AREDS PO) Take  by mouth 2 times daily. No current facility-administered medications for this visit. IMPRESSIONS:      Kidney disease: CKD stage IIIA that remains stable  Anemia: Anemia remains stable. No need for an erythrocyte stimulating agent (DIANE). Bone and mineral metabolism: stable       PLAN:  1. We discussed the eGFR today. 2. We will continue all current medications without changes. 3. We will see the patient back in 6 months.               _________________________________  Cydney Grady.  Osiel Alvarado DO  [facility]      Jannie Cornell MD

## 2020-04-07 LAB
ALBUMIN SERPL-MCNC: 3.4 G/DL
ALP BLD-CCNC: 105 U/L
ALT SERPL-CCNC: 26 U/L
ANION GAP SERPL CALCULATED.3IONS-SCNC: 10 MMOL/L
AST SERPL-CCNC: 24 U/L
BILIRUB SERPL-MCNC: 1.2 MG/DL (ref 0.1–1.4)
BUN BLDV-MCNC: 35 MG/DL
CALCIUM SERPL-MCNC: 9.2 MG/DL
CHLORIDE BLD-SCNC: 107 MMOL/L
CO2: 29 MMOL/L
CREAT SERPL-MCNC: 1.7 MG/DL
GFR CALCULATED: 41
GLUCOSE BLD-MCNC: 92 MG/DL
POTASSIUM SERPL-SCNC: 4.2 MMOL/L
SODIUM BLD-SCNC: 142 MMOL/L
TOTAL PROTEIN: 7

## 2020-05-19 LAB
BASOPHILS ABSOLUTE: ABNORMAL
BASOPHILS RELATIVE PERCENT: ABNORMAL
BUN BLDV-MCNC: 32 MG/DL
CALCIUM SERPL-MCNC: 9.4 MG/DL
CHLORIDE BLD-SCNC: 108 MMOL/L
CO2: 28 MMOL/L
CREAT SERPL-MCNC: 1.4 MG/DL
EOSINOPHILS ABSOLUTE: ABNORMAL
EOSINOPHILS RELATIVE PERCENT: ABNORMAL
GFR CALCULATED: 51
GLUCOSE BLD-MCNC: 117 MG/DL
HCT VFR BLD CALC: 41.7 % (ref 41–53)
HEMOGLOBIN: 13.4 G/DL (ref 13.5–17.5)
LYMPHOCYTES ABSOLUTE: ABNORMAL
LYMPHOCYTES RELATIVE PERCENT: ABNORMAL
MCH RBC QN AUTO: ABNORMAL PG
MCHC RBC AUTO-ENTMCNC: ABNORMAL G/DL
MCV RBC AUTO: ABNORMAL FL
MONOCYTES ABSOLUTE: ABNORMAL
MONOCYTES RELATIVE PERCENT: ABNORMAL
NEUTROPHILS ABSOLUTE: ABNORMAL
NEUTROPHILS RELATIVE PERCENT: ABNORMAL
PLATELET # BLD: 124 K/ΜL
PMV BLD AUTO: ABNORMAL FL
POTASSIUM SERPL-SCNC: 3.9 MMOL/L
RBC # BLD: 4.28 10^6/ΜL
SODIUM BLD-SCNC: 142 MMOL/L
WBC # BLD: 6.71 10^3/ML

## 2020-05-26 ENCOUNTER — OFFICE VISIT (OUTPATIENT)
Dept: NEPHROLOGY | Age: 85
End: 2020-05-26
Payer: MEDICARE

## 2020-05-26 VITALS
BODY MASS INDEX: 27.18 KG/M2 | WEIGHT: 206 LBS | SYSTOLIC BLOOD PRESSURE: 118 MMHG | HEART RATE: 57 BPM | DIASTOLIC BLOOD PRESSURE: 60 MMHG | OXYGEN SATURATION: 98 %

## 2020-05-26 PROCEDURE — 1123F ACP DISCUSS/DSCN MKR DOCD: CPT | Performed by: INTERNAL MEDICINE

## 2020-05-26 PROCEDURE — 99213 OFFICE O/P EST LOW 20 MIN: CPT | Performed by: INTERNAL MEDICINE

## 2020-05-26 PROCEDURE — 1036F TOBACCO NON-USER: CPT | Performed by: INTERNAL MEDICINE

## 2020-05-26 PROCEDURE — 4040F PNEUMOC VAC/ADMIN/RCVD: CPT | Performed by: INTERNAL MEDICINE

## 2020-05-26 PROCEDURE — G8417 CALC BMI ABV UP PARAM F/U: HCPCS | Performed by: INTERNAL MEDICINE

## 2020-05-26 PROCEDURE — G8427 DOCREV CUR MEDS BY ELIG CLIN: HCPCS | Performed by: INTERNAL MEDICINE

## 2020-05-26 RX ORDER — LIDOCAINE HCL 4 %
5000 CREAM (GRAM) TOPICAL DAILY
COMMUNITY
Start: 2020-03-04

## 2020-05-26 NOTE — PATIENT INSTRUCTIONS
KNOW YOUR KIDNEY NUMBERS    Your kidney speed (eGFR) was 51 ml/min this visit (normal is  ml/min)(Ml/min=milliliters of blood filtered per minute). The higher this number is, the better your kidney function is. Your serum creatinine was 1.4 (normal 0.8-1.2 mg/dl at most labs). The higher this number is, the worse your kidney function is. You are in stage G-IIIA-A1 of chronic kidney disease. Your kidney function has improved as compared to your last visit. Stages of kidney disease    EGFR (estimated glomerular filtration rate)    G-I > 90 ml/min  G-II 60-89 ml/min  G-IIIA 45-59 ml/min  G-IIIB 30-44 ml/min  G-IV 15-29 ml/min  G-V < 15 mlmin    ( may need dialysis)    ACR (urine albumin/creatinine ratio)    A-1       ACR<3  A-2 ACR 3-30  A-3 ACR >30    Our goal is to keep your eGFR going as fast as possible ( ml/min is normal). If your eGFR declines to 15-24 ml/min and stays there without recovery,  we will begin to educate you about dialysis or kidney transplant. The leading cause of kidney disease in the world is diabetes mellitus. Keep your sugar  as much as possible. The second leading cause is hypertension. Keep Your blood pressure 120-140/70-80 as much as possible. If you need refills, call the office or your drug store. You may call the office any time with any questions or concerns. DUE TO THE CORONAVIRUS CONCERN, PLEASE LIMIT YOUR TIME IN PUBLIC. 8 Kerri Chunidi YOUR HANDS COMPLETELY AND FREQUENTLY. Judy Dillon

## 2020-05-26 NOTE — PROGRESS NOTES
deficits noted. Muscle strength and tone are equal throughout. Skin:                Warm and dry with no rashes. Muscles:         Hand  and leg strength are equal and strong bilaterally.      Lab Data      CBC:   Lab Results   Component Value Date    WBC 6.71 05/19/2020    HGB 13.4 (A) 05/19/2020    HCT 41.7 05/19/2020     05/19/2020     BMP:    Lab Results   Component Value Date     05/19/2020     04/07/2020     09/03/2019    K 3.9 05/19/2020    K 4.2 04/07/2020    K 4.0 09/03/2019     05/19/2020     04/07/2020     09/03/2019    CO2 28 05/19/2020    CO2 29 04/07/2020    CO2 29 09/03/2019    BUN 32 05/19/2020    BUN 35 04/07/2020    BUN 29 09/03/2019    CREATININE 1.4 05/19/2020    CREATININE 1.7 04/07/2020    CREATININE 1.6 09/03/2019    GLUCOSE 117 05/19/2020    GLUCOSE 92 04/07/2020    GLUCOSE 76 09/03/2019      Hepatic:   Lab Results   Component Value Date    AST 24 04/07/2020    AST 23 09/08/2015    ALT 26 04/07/2020    ALT 31 09/08/2015    BILITOT 1.2 04/07/2020    BILITOT 1.4 09/08/2015    ALKPHOS 105 04/07/2020    ALKPHOS 85 09/08/2015     BNP: No results found for: BNP  Lipids: No results found for: CHOL, HDL  INR: No results found for: INR  URINE: No results found for: NAUR, PROTUR  No results found for: NITRU, COLORU, PHUR, LABCAST, WBCUA, RBCUA, MUCUS, TRICHOMONAS, YEAST, BACTERIA, CLARITYU, SPECGRAV, LEUKOCYTESUR, UROBILINOGEN, BILIRUBINUR, BLOODU, GLUCOSEU, KETUA, AMORPHOUS   Microalbumen/Creatinine ratio:  No components found for: RUCREAT        Medications:    Current Outpatient Medications   Medication Sig Dispense Refill    CVS VITAMIN D3 250 MCG (38252 UT) CAPS capsule TAKE 1 CAPSULE BY MOUTH EVERY DAY      midodrine (PROAMATINE) 10 MG tablet Take 10 mg by mouth 3 times daily      tamsulosin (FLOMAX) 0.4 MG capsule Take 0.4 mg by mouth daily      senna (SENOKOT) 8.6 MG tablet Take 1 tablet by mouth daily      docusate sodium (COLACE) 100 MG

## 2020-06-17 LAB
A/G RATIO: 0.9
ALBUMIN SERPL-MCNC: 3.2 G/DL
ALP BLD-CCNC: 117 U/L
ALT SERPL-CCNC: 26 U/L
AST SERPL-CCNC: 26 U/L
BILIRUB SERPL-MCNC: 1.1 MG/DL (ref 0.1–1.4)
BILIRUBIN DIRECT: 0.3 MG/DL
BILIRUBIN, INDIRECT: NORMAL
CHOLESTEROL, TOTAL: 121 MG/DL
CHOLESTEROL/HDL RATIO: NORMAL
GLOBULIN: 3.5
HDLC SERPL-MCNC: 60 MG/DL (ref 35–70)
LDL CHOLESTEROL CALCULATED: 51 MG/DL (ref 0–160)
PROTEIN TOTAL: 6.7 G/DL
TRIGL SERPL-MCNC: 51 MG/DL
VLDLC SERPL CALC-MCNC: 10 MG/DL

## 2020-09-15 LAB
BASOPHILS ABSOLUTE: ABNORMAL
BASOPHILS RELATIVE PERCENT: ABNORMAL
BUN BLDV-MCNC: 28 MG/DL
CALCIUM SERPL-MCNC: 9.1 MG/DL
CHLORIDE BLD-SCNC: 106 MMOL/L
CO2: 30 MMOL/L
CREAT SERPL-MCNC: 1.5 MG/DL
EOSINOPHILS ABSOLUTE: ABNORMAL
EOSINOPHILS RELATIVE PERCENT: ABNORMAL
GFR CALCULATED: 47
GLUCOSE BLD-MCNC: 91 MG/DL
HCT VFR BLD CALC: 41.4 % (ref 41–53)
HEMOGLOBIN: 13.2 G/DL (ref 13.5–17.5)
IRON SATURATION: 27
IRON: 78
LYMPHOCYTES ABSOLUTE: ABNORMAL
LYMPHOCYTES RELATIVE PERCENT: ABNORMAL
MCH RBC QN AUTO: ABNORMAL PG
MCHC RBC AUTO-ENTMCNC: ABNORMAL G/DL
MCV RBC AUTO: ABNORMAL FL
MONOCYTES ABSOLUTE: ABNORMAL
MONOCYTES RELATIVE PERCENT: ABNORMAL
NEUTROPHILS ABSOLUTE: ABNORMAL
NEUTROPHILS RELATIVE PERCENT: ABNORMAL
PLATELET # BLD: 131 K/ΜL
PMV BLD AUTO: ABNORMAL FL
POTASSIUM SERPL-SCNC: 4.4 MMOL/L
RBC # BLD: 4.29 10^6/ΜL
SODIUM BLD-SCNC: 141 MMOL/L
TOTAL IRON BINDING CAPACITY: 279
VITAMIN B-12: 346
WBC # BLD: 6.56 10^3/ML

## 2020-09-22 ENCOUNTER — OFFICE VISIT (OUTPATIENT)
Dept: NEPHROLOGY | Age: 85
End: 2020-09-22
Payer: MEDICARE

## 2020-09-22 VITALS
WEIGHT: 220 LBS | OXYGEN SATURATION: 98 % | BODY MASS INDEX: 29.16 KG/M2 | DIASTOLIC BLOOD PRESSURE: 62 MMHG | HEIGHT: 73 IN | TEMPERATURE: 97.3 F | SYSTOLIC BLOOD PRESSURE: 110 MMHG | RESPIRATION RATE: 18 BRPM | HEART RATE: 63 BPM

## 2020-09-22 PROCEDURE — G8417 CALC BMI ABV UP PARAM F/U: HCPCS | Performed by: INTERNAL MEDICINE

## 2020-09-22 PROCEDURE — 1036F TOBACCO NON-USER: CPT | Performed by: INTERNAL MEDICINE

## 2020-09-22 PROCEDURE — 1123F ACP DISCUSS/DSCN MKR DOCD: CPT | Performed by: INTERNAL MEDICINE

## 2020-09-22 PROCEDURE — 99213 OFFICE O/P EST LOW 20 MIN: CPT | Performed by: INTERNAL MEDICINE

## 2020-09-22 PROCEDURE — 4040F PNEUMOC VAC/ADMIN/RCVD: CPT | Performed by: INTERNAL MEDICINE

## 2020-09-22 PROCEDURE — G8427 DOCREV CUR MEDS BY ELIG CLIN: HCPCS | Performed by: INTERNAL MEDICINE

## 2020-09-22 NOTE — PROGRESS NOTES
[facility]    [address] [facility] [address]  [address], [address]  [phone]       Progress Note    9/22/2020 9:31 AM    Pt Name:    Jesse Hunt  YOB: 1931  Primary Care Physician:  Terrance Garnica MD       Chief Complaint:   Chief Complaint   Patient presents with    Anemia    Chronic Kidney Disease    Hematuria    Hypertension    Nephropathy    Proteinuria    Other     Nephrotic syndrome    Nephrolithiasis        History of Chief Complaint: CKD stage G-IIIA-A1  from nephrotic syndrome and nephrolithiasis      Subjective:  I last saw the patient in clinic 05/26/2020. I follow the patient for Chronic Kidney disease stage G-IIIA-A1. Since our last visit the patient has not been hospitalized. The patient is sleeping poorly at night with 1-2 times per night nocturia. The patient has a good appetite and is remaining active. The patient denied N/V/C/D/SOB/CP. He has no trouble at bladder emptying. Serum iron, A53 and folic acid levels were all normal. He has undergone colonoscopy many times. COVID-19 screening  Fever: none  Cough: none  Exposure: none  Shortness of breath: non    Protein/creatinine ratio:   eGFR: 47 Ml/min      Last six eGFR readings:  Lab Results   Component Value Date    LABGLOM 47 09/15/2020    LABGLOM 51 05/19/2020    LABGLOM 41 04/07/2020    LABGLOM 44 09/03/2019    LABGLOM 44 04/22/2019    LABGLOM 56 11/05/2018          Objective:  VITALS:  /62 (Site: Left Upper Arm, Position: Sitting, Cuff Size: Small Adult)   Pulse 63   Temp 97.3 °F (36.3 °C)   Resp 18   Ht 6' 1\" (1.854 m)   Wt 220 lb (99.8 kg)   SpO2 98%   BMI 29.03 kg/m²   Weight:   Wt Readings from Last 3 Encounters:   09/22/20 220 lb (99.8 kg)   05/26/20 206 lb (93.4 kg)   09/10/19 206 lb (93.4 kg)     Body mass index is 29.03 kg/m². Physical examination    General:  Alert and cooperative with exam  HEENT:  Normocephalic. No lesions nor rashes. JERO.  EOMI  Neck:   No JVD Microalbumen/Creatinine ratio:  No components found for: RUCREAT        Medications:    Current Outpatient Medications   Medication Sig Dispense Refill    CVS VITAMIN D3 250 MCG (14530 UT) CAPS capsule TAKE 1 CAPSULE BY MOUTH EVERY DAY      midodrine (PROAMATINE) 10 MG tablet Take 10 mg by mouth 3 times daily      tamsulosin (FLOMAX) 0.4 MG capsule Take 0.4 mg by mouth daily      senna (SENOKOT) 8.6 MG tablet Take 1 tablet by mouth daily      docusate sodium (COLACE) 100 MG capsule Take 100 mg by mouth daily       atorvastatin (LIPITOR) 40 MG tablet Take 40 mg by mouth daily      aspirin 81 MG tablet Take 81 mg by mouth daily      Sodium Bicarbonate-Citric Acid (JUAN MIGUEL-SELTZER HEARTBURN PO) Take by mouth      metoprolol tartrate (LOPRESSOR) 25 MG tablet 12.5 mg daily   0    BRILINTA 90 MG TABS tablet 90 mg 2 times daily      nitroGLYCERIN (NITROSTAT) 0.4 MG SL tablet       IBUPROFEN PO Take by mouth as needed      desoximetasone (TOPICORT) 0.25 % cream   2    Multiple Vitamins-Minerals (PRESERVISION AREDS PO) Take  by mouth 2 times daily. No current facility-administered medications for this visit. IMPRESSIONS:      Kidney disease: CKD stage G-IIIA-A1  Anemia: Anemia remains stable. No need for an erythrocyte stimulating agent (DIANE). Iron, L43, folic acid were normal.   Bone and mineral metabolism: There is no complaint of bone pain. PLAN:  1. We discussed the eGFR today. 2. We will continue all current medications without changes. 3. cologuard per patient request  4. Checking protein/creatinine ratio. 5. We will see the patient back in 4 months.       _________________________________  Winton Moritz.  Mehran Bates DO  [facility]      Kelly Moreira MD

## 2020-09-22 NOTE — PATIENT INSTRUCTIONS
KNOW YOUR KIDNEY NUMBERS    Your kidney speed (eGFR) was 47 ml/min this visit (normal is  ml/min)(Ml/min=milliliters of blood filtered per minute). The higher this number is, the better your kidney function is. Your serum creatinine was 1.5 (normal 0.8-1.2 mg/dl at most labs). The higher this number is, the worse your kidney function is. You are in stage G-IIIA-A1 of chronic kidney disease. Your kidney function has declined as compared to your last visit. Your last eGFR was  51 Ml/Min. Stages of kidney disease    EGFR (estimated glomerular filtration rate)    G-I > 90 ml/min Kidney damage with normal kidney function (blood or protein in the urine)  G-II 60-89 ml/min Normal kidney function with mild damage with or without blood or protein in the urine  G-IIIA 45-59 ml/min Mild to moderate loss of kidney function. G-IIIB 30-44 ml/min Moderate to severe loss of kidney function  G-IV 15-29 ml/min Severe loss of kidney function  G-V < 15 mlmin     May need dialysis or kidney transplant    ACR (urine albumin/creatinine ratio) (Mg/Gm)    A-1      ACR<30 Normal to mildly increased protein in the urine. A-2 ACR  Moderate increase in urine protein loss. A-3 ACR >300 Severe increase in urine protein loss    Our goal is to keep your eGFR going as fast as possible ( ml/min is normal). If your eGFR declines to 15-24 ml/min and stays there without recovery,  we will begin to educate you about dialysis or kidney transplant. We also want to keep the protein in your urine as low as possible. The leading cause of kidney disease in the world is diabetes mellitus. Keep your sugar  as much as possible. The second leading cause is hypertension. Keep Your blood pressure 120-140/70-80 as much as possible. If you need refills, call the office or your drug store. You may call the office any time with any questions or concerns.     DUE TO THE CORONAVIRUS CONCERN, PLEASE LIMIT YOUR TIME IN PUBLIC. [address] YOUR HANDS COMPLETELY AND FREQUENTLY. Kaitlynn Rivera

## 2020-09-28 LAB
ALBUMIN SERPL-MCNC: 3.2 G/DL
ALP BLD-CCNC: 133 U/L
ALT SERPL-CCNC: 39 U/L
ANION GAP SERPL CALCULATED.3IONS-SCNC: NORMAL MMOL/L
AST SERPL-CCNC: 33 U/L
BASOPHILS ABSOLUTE: ABNORMAL
BASOPHILS RELATIVE PERCENT: ABNORMAL
BILIRUB SERPL-MCNC: 0.8 MG/DL (ref 0.1–1.4)
BUN BLDV-MCNC: 30 MG/DL
CALCIUM SERPL-MCNC: 9 MG/DL
CHLORIDE BLD-SCNC: 105 MMOL/L
CHOLESTEROL, TOTAL: 124 MG/DL
CHOLESTEROL/HDL RATIO: NORMAL
CO2: 28 MMOL/L
CREAT SERPL-MCNC: 1.6 MG/DL
EOSINOPHILS ABSOLUTE: ABNORMAL
EOSINOPHILS RELATIVE PERCENT: ABNORMAL
GFR CALCULATED: 43
GLUCOSE BLD-MCNC: 104 MG/DL
HCT VFR BLD CALC: 40 % (ref 41–53)
HDLC SERPL-MCNC: 53 MG/DL (ref 35–70)
HEMOGLOBIN: 12.7 G/DL (ref 13.5–17.5)
LDL CHOLESTEROL CALCULATED: 57 MG/DL (ref 0–160)
LYMPHOCYTES ABSOLUTE: ABNORMAL
LYMPHOCYTES RELATIVE PERCENT: ABNORMAL
MCH RBC QN AUTO: ABNORMAL PG
MCHC RBC AUTO-ENTMCNC: ABNORMAL G/DL
MCV RBC AUTO: ABNORMAL FL
MONOCYTES ABSOLUTE: ABNORMAL
MONOCYTES RELATIVE PERCENT: ABNORMAL
NEUTROPHILS ABSOLUTE: ABNORMAL
NEUTROPHILS RELATIVE PERCENT: ABNORMAL
NONHDLC SERPL-MCNC: NORMAL MG/DL
PLATELET # BLD: 144 K/ΜL
PMV BLD AUTO: ABNORMAL FL
POTASSIUM SERPL-SCNC: 4.7 MMOL/L
RBC # BLD: 4.15 10^6/ΜL
SODIUM BLD-SCNC: 139 MMOL/L
TOTAL PROTEIN: 6.9
TRIGL SERPL-MCNC: 72 MG/DL
VITAMIN D 25-HYDROXY: 53
VITAMIN D2, 25 HYDROXY: <1
VITAMIN D3,25 HYDROXY: 53
VLDLC SERPL CALC-MCNC: 14 MG/DL
WBC # BLD: 5.73 10^3/ML

## 2020-12-30 LAB
BASOPHILS ABSOLUTE: ABNORMAL
BASOPHILS RELATIVE PERCENT: ABNORMAL
BUN BLDV-MCNC: 27 MG/DL
CALCIUM SERPL-MCNC: 9.8 MG/DL
CHLORIDE BLD-SCNC: 107 MMOL/L
CO2: 25 MMOL/L
CREAT SERPL-MCNC: 1.27 MG/DL
EOSINOPHILS ABSOLUTE: ABNORMAL
EOSINOPHILS RELATIVE PERCENT: ABNORMAL
GFR CALCULATED: 60
GLUCOSE BLD-MCNC: 119 MG/DL
HCT VFR BLD CALC: 40 % (ref 41–53)
HEMOGLOBIN: 12.4 G/DL (ref 13.5–17.5)
LYMPHOCYTES ABSOLUTE: ABNORMAL
LYMPHOCYTES RELATIVE PERCENT: ABNORMAL
MCH RBC QN AUTO: ABNORMAL PG
MCHC RBC AUTO-ENTMCNC: ABNORMAL G/DL
MCV RBC AUTO: ABNORMAL FL
MONOCYTES ABSOLUTE: ABNORMAL
MONOCYTES RELATIVE PERCENT: ABNORMAL
NEUTROPHILS ABSOLUTE: ABNORMAL
NEUTROPHILS RELATIVE PERCENT: ABNORMAL
PLATELET # BLD: 128 K/ΜL
PMV BLD AUTO: ABNORMAL FL
POTASSIUM SERPL-SCNC: 4.2 MMOL/L
RBC # BLD: 4.13 10^6/ΜL
SODIUM BLD-SCNC: 137 MMOL/L
WBC # BLD: 6.5 10^3/ML

## 2021-04-06 PROBLEM — E80.4 GILBERT'S SYNDROME: Status: ACTIVE | Noted: 2021-04-06

## 2021-04-06 PROBLEM — M25.561 CHRONIC PAIN OF BOTH KNEES: Status: ACTIVE | Noted: 2021-04-06

## 2021-04-06 PROBLEM — I48.91 ATRIAL FIBRILLATION WITH RAPID VENTRICULAR RESPONSE (HCC): Status: ACTIVE | Noted: 2021-04-06

## 2021-04-06 PROBLEM — R35.1 NOCTURIA: Status: ACTIVE | Noted: 2021-04-06

## 2021-04-06 PROBLEM — M25.562 CHRONIC PAIN OF BOTH KNEES: Status: ACTIVE | Noted: 2021-04-06

## 2021-04-06 PROBLEM — I95.9 HYPOTENSION, UNSPECIFIED: Status: ACTIVE | Noted: 2021-04-06

## 2021-04-06 PROBLEM — M87.051 IDIOPATHIC ASEPTIC NECROSIS OF RIGHT FEMUR (HCC): Status: ACTIVE | Noted: 2021-04-06

## 2021-04-06 PROBLEM — R06.00 DYSPNEA, UNSPECIFIED: Status: ACTIVE | Noted: 2021-04-06

## 2021-04-06 PROBLEM — M53.3 SACROILIAC JOINT PAIN: Status: ACTIVE | Noted: 2021-04-06

## 2021-04-06 PROBLEM — T39.395A: Status: ACTIVE | Noted: 2021-04-06

## 2021-04-06 PROBLEM — R00.0 TACHYCARDIA: Status: ACTIVE | Noted: 2021-04-06

## 2021-04-06 PROBLEM — F01.50 VASCULAR DEMENTIA WITHOUT BEHAVIORAL DISTURBANCE (HCC): Status: ACTIVE | Noted: 2021-04-06

## 2021-04-06 PROBLEM — E11.9 TYPE 2 DIABETES MELLITUS WITHOUT COMPLICATION (HCC): Status: ACTIVE | Noted: 2021-04-06

## 2021-04-06 PROBLEM — Z87.2 HISTORY OF ATOPIC DERMATITIS: Status: ACTIVE | Noted: 2021-04-06

## 2021-04-06 PROBLEM — K25.9 GASTRIC ULCER: Status: ACTIVE | Noted: 2021-04-06

## 2021-04-06 PROBLEM — Z87.2 PERSONAL HISTORY OF DISEASES OF THE SKIN AND SUBCUTANEOUS TISSUE: Status: ACTIVE | Noted: 2021-04-06

## 2021-04-06 PROBLEM — M54.50 LOW BACK PAIN: Status: ACTIVE | Noted: 2021-04-06

## 2021-04-06 PROBLEM — F41.1 GENERALIZED ANXIETY DISORDER: Status: ACTIVE | Noted: 2021-04-06

## 2021-04-06 PROBLEM — I21.9 ACUTE MYOCARDIAL INFARCTION (HCC): Status: ACTIVE | Noted: 2021-04-06

## 2021-04-06 PROBLEM — J44.9 CHRONIC OBSTRUCTIVE PULMONARY DISEASE (HCC): Status: ACTIVE | Noted: 2021-04-06

## 2021-04-06 PROBLEM — K92.2: Status: ACTIVE | Noted: 2021-04-06

## 2021-04-06 PROBLEM — G89.29 CHRONIC PAIN OF BOTH KNEES: Status: ACTIVE | Noted: 2021-04-06

## 2021-04-06 PROBLEM — B00.0 ECZEMA HERPETICUM: Status: ACTIVE | Noted: 2021-04-06

## 2021-04-06 PROBLEM — D69.6 THROMBOCYTOPENIA (HCC): Status: ACTIVE | Noted: 2021-04-06

## 2021-04-06 PROBLEM — H93.19 TINNITUS: Status: ACTIVE | Noted: 2021-04-06

## 2021-04-06 PROBLEM — E55.9 VITAMIN D DEFICIENCY: Status: ACTIVE | Noted: 2021-04-06

## 2021-04-06 PROBLEM — R05.9 COUGH: Status: ACTIVE | Noted: 2021-04-06

## 2021-04-06 PROBLEM — R41.89 IMPAIRED COGNITION: Status: ACTIVE | Noted: 2021-04-06

## 2021-04-19 LAB
BASOPHILS ABSOLUTE: 0.04 /ΜL
BASOPHILS RELATIVE PERCENT: 0.5 %
BUN BLDV-MCNC: 25 MG/DL
CALCIUM SERPL-MCNC: 8.5 MG/DL
CHLORIDE BLD-SCNC: 105 MMOL/L
CO2: 28 MMOL/L
CREAT SERPL-MCNC: 1.9 MG/DL
EOSINOPHILS ABSOLUTE: 0.14 /ΜL
EOSINOPHILS RELATIVE PERCENT: 1.8 %
GFR CALCULATED: 36
GLUCOSE BLD-MCNC: 95 MG/DL
HCT VFR BLD CALC: 40.5 % (ref 41–53)
HEMOGLOBIN: 12.6 G/DL (ref 13.5–17.5)
LYMPHOCYTES ABSOLUTE: 1.23 /ΜL
LYMPHOCYTES RELATIVE PERCENT: 16.1 %
MCH RBC QN AUTO: 30.4 PG
MCHC RBC AUTO-ENTMCNC: 31.1 G/DL
MCV RBC AUTO: 97.8 FL
MONOCYTES ABSOLUTE: 1.03 /ΜL
MONOCYTES RELATIVE PERCENT: 13.5 %
NEUTROPHILS ABSOLUTE: 5.17 /ΜL
NEUTROPHILS RELATIVE PERCENT: 67.7 %
PLATELET # BLD: 131 K/ΜL
PMV BLD AUTO: 12.4 FL
POTASSIUM SERPL-SCNC: 4.4 MMOL/L
RBC # BLD: 4.14 10^6/ΜL
SODIUM BLD-SCNC: 140 MMOL/L
WBC # BLD: 7.64 10^3/ML

## 2021-04-26 PROBLEM — I48.0 PAF (PAROXYSMAL ATRIAL FIBRILLATION) (HCC): Status: ACTIVE | Noted: 2021-04-06

## 2021-04-26 PROBLEM — I21.4 NSTEMI (NON-ST ELEVATED MYOCARDIAL INFARCTION) (HCC): Status: ACTIVE | Noted: 2021-04-06

## 2021-04-26 PROBLEM — F03.90 DEMENTIA WITHOUT BEHAVIORAL DISTURBANCE (HCC): Status: ACTIVE | Noted: 2021-04-26

## 2021-04-26 PROBLEM — R09.89 BILATERAL CAROTID BRUITS: Status: ACTIVE | Noted: 2021-04-26

## 2021-04-26 PROBLEM — Z95.5 STENTED CORONARY ARTERY: Status: ACTIVE | Noted: 2021-04-26

## 2021-04-26 PROBLEM — I25.5 ISCHEMIC CARDIOMYOPATHY: Status: ACTIVE | Noted: 2021-04-26

## 2021-04-26 PROBLEM — I10 ESSENTIAL HYPERTENSION: Status: ACTIVE | Noted: 2021-04-26

## 2021-04-26 PROBLEM — G47.10 HYPERSOMNOLENCE: Status: ACTIVE | Noted: 2021-04-26

## 2021-04-26 PROBLEM — R42 ORTHOSTATIC DIZZINESS: Status: ACTIVE | Noted: 2021-04-26

## 2021-04-27 ENCOUNTER — OFFICE VISIT (OUTPATIENT)
Dept: NEPHROLOGY | Age: 86
End: 2021-04-27
Payer: MEDICARE

## 2021-04-27 VITALS
DIASTOLIC BLOOD PRESSURE: 68 MMHG | HEIGHT: 73 IN | BODY MASS INDEX: 29.82 KG/M2 | OXYGEN SATURATION: 96 % | SYSTOLIC BLOOD PRESSURE: 138 MMHG | WEIGHT: 225 LBS | RESPIRATION RATE: 18 BRPM | HEART RATE: 56 BPM

## 2021-04-27 DIAGNOSIS — N18.32 STAGE 3B CHRONIC KIDNEY DISEASE (HCC): Primary | ICD-10-CM

## 2021-04-27 PROCEDURE — 1123F ACP DISCUSS/DSCN MKR DOCD: CPT | Performed by: INTERNAL MEDICINE

## 2021-04-27 PROCEDURE — 1036F TOBACCO NON-USER: CPT | Performed by: INTERNAL MEDICINE

## 2021-04-27 PROCEDURE — G8428 CUR MEDS NOT DOCUMENT: HCPCS | Performed by: INTERNAL MEDICINE

## 2021-04-27 PROCEDURE — 99214 OFFICE O/P EST MOD 30 MIN: CPT | Performed by: INTERNAL MEDICINE

## 2021-04-27 PROCEDURE — G8417 CALC BMI ABV UP PARAM F/U: HCPCS | Performed by: INTERNAL MEDICINE

## 2021-04-27 PROCEDURE — 4040F PNEUMOC VAC/ADMIN/RCVD: CPT | Performed by: INTERNAL MEDICINE

## 2021-04-27 RX ORDER — ASPIRIN 81 MG/1
81 TABLET ORAL DAILY
COMMUNITY

## 2021-04-27 RX ORDER — AMIODARONE HYDROCHLORIDE 200 MG/1
200 TABLET ORAL DAILY
COMMUNITY

## 2021-04-27 NOTE — PROGRESS NOTES
[facility]    [address] [facility] [address]  [address], [address]  [phone]       Progress Note    4/27/2021 2:15 PM    Pt Name:    Silas Mcfarland  YOB: 1931  Primary Care Physician:  Britt Bales MD       Chief Complaint:   Chief Complaint   Patient presents with    Chronic Kidney Disease    Hypertension    Nephropathy    Nephrolithiasis    Obesity    Proteinuria     nephrotic syndrome        History of Chief Complaint: CKD stage G-IIIA-A1  from nephrotic syndrome and nephrolithiasis      Subjective:  I last saw the patient in clinic 09/22/2021. I follow the patient for Chronic Kidney disease stage G-IIIB-A1. Since our last visit the patient has been hospitalized at [facility] for bleeding gastric ulcers. The patient is sleeping well at night with 1-2 times per night nocturia. The patient has a good appetite and is remaining active. The patient denied N/V/C/D/SOB/CP. He has no trouble at bladder emptying. COVID-19 screening  Fever: none  Cough: none  Exposure: none  Shortness of breath: none    Protein/creatinine ratio:   eGFR: 36 Ml/min (it was 60 Ml/Min last visit)  SCr: 1.9 Mg/Dl (It was 1.0 Mg/Dl last visit)      Last six eGFR readings:  Lab Results   Component Value Date    LABGLOM 36 04/19/2021    LABGLOM 60 12/30/2020    LABGLOM 43 09/28/2020    LABGLOM 47 09/15/2020    LABGLOM 51 05/19/2020    LABGLOM 41 04/07/2020          Objective:  VITALS:  /68 (Site: Right Upper Arm, Position: Sitting, Cuff Size: Small Adult)   Pulse 56   Resp 18   Ht 6' 1\" (1.854 m)   Wt 225 lb (102.1 kg)   SpO2 96%   BMI 29.69 kg/m²   Weight:   Wt Readings from Last 3 Encounters:   04/27/21 225 lb (102.1 kg)   04/06/21 220 lb (99.8 kg)   09/22/20 220 lb (99.8 kg)     Body mass index is 29.69 kg/m². Physical examination    General:  Alert and cooperative with exam  HEENT:  Normocephalic. No lesions nor rashes. JERO. EOMI  Neck:   No JVD and no bruits.  Thyroid gland is normal  Lungs:  Breathing easily. No rales nor rhonchi. No cough nor sputum production. Heart[de-identified]            RRR. No murmurs nor rubs. PMI is not enlarged nor displaced. Abdomen:  Soft and non tender. Bowel sounds are active in all four quadrants. Extremities:  1+ edema  Neurologic:  CN II-XII are intact. No deficits noted. Muscle strength and tone are equal throughout. Skin:                Warm and dry with no rashes. Muscles:         Hand  and leg strength are equal and strong bilaterally.      Lab Data      CBC:   Lab Results   Component Value Date    WBC 7.64 04/19/2021    HGB 12.6 (A) 04/19/2021    HCT 40.5 (A) 04/19/2021    MCV 97.8 04/19/2021     04/19/2021     BMP:    Lab Results   Component Value Date     04/19/2021     12/30/2020     09/28/2020    K 4.4 04/19/2021    K 4.2 12/30/2020    K 4.7 09/28/2020     04/19/2021     12/30/2020     09/28/2020    CO2 28 04/19/2021    CO2 25 12/30/2020    CO2 28 09/28/2020    BUN 25 04/19/2021    BUN 27 12/30/2020    BUN 30 09/28/2020    CREATININE 1.9 04/19/2021    CREATININE 1.27 12/30/2020    CREATININE 1.6 09/28/2020    GLUCOSE 95 04/19/2021    GLUCOSE 119 12/30/2020    GLUCOSE 104 09/28/2020      Hepatic:   Lab Results   Component Value Date    AST 33 09/28/2020    AST 26 06/17/2020    AST 24 04/07/2020    ALT 39 09/28/2020    ALT 26 06/17/2020    ALT 26 04/07/2020    BILITOT 0.8 09/28/2020    BILITOT 1.1 06/17/2020    BILITOT 1.2 04/07/2020    ALKPHOS 133 09/28/2020    ALKPHOS 117 06/17/2020    ALKPHOS 105 04/07/2020     BNP: No results found for: BNP  Lipids:   Lab Results   Component Value Date    CHOL 124 09/28/2020    HDL 53 09/28/2020     INR: No results found for: INR  URINE: No results found for: Willodean Sale  No results found for: Tiffany Courtney, ERNESTINE, LABCAST, [address], RBCUA, MUCUS, TRICHOMONAS, YEAST, BACTERIA, CLARITYU, [address], [address] [facility], [address], [address] [place] [address], BLOODU, GLUCOSEU, KETUA, AMORPHOUS Microalbumen/Creatinine ratio:  No components found for: RUCREAT        Medications:    Current Outpatient Medications   Medication Sig Dispense Refill    amiodarone (CORDARONE) 200 MG tablet Take 200 mg by mouth daily      aspirin 81 MG EC tablet Take 81 mg by mouth daily      famotidine (PEPCID) 20 MG tablet Take 1 tablet by mouth daily 90 tablet 3    [employer] VITAMIN D3 250 MCG (32312 UT) CAPS capsule TAKE 1 CAPSULE BY MOUTH EVERY DAY      midodrine (PROAMATINE) 10 MG tablet Take 10 mg by mouth 3 times daily      tamsulosin (FLOMAX) 0.4 MG capsule Take 0.4 mg by mouth daily      senna (SENOKOT) 8.6 MG tablet Take 1 tablet by mouth daily      docusate sodium (COLACE) 100 MG capsule Take 100 mg by mouth daily       atorvastatin (LIPITOR) 40 MG tablet Take 40 mg by mouth daily      metoprolol tartrate (LOPRESSOR) 25 MG tablet 12.5 mg daily   0    nitroGLYCERIN (NITROSTAT) 0.4 MG SL tablet       IBUPROFEN PO Take by mouth as needed      Multiple Vitamins-Minerals (PRESERVISION AREDS PO) Take  by mouth 2 times daily. No current facility-administered medications for this visit. IMPRESSIONS:        Kidney disease: CKD stage G-IIIB-A1  Acute kidney injury  Anemia: Anemia remains stable. No need for an erythrocyte stimulating agent (DIANE). Bone and mineral metabolism: There is no complaint of bone pain. PLAN:  1. We discussed the eGFR today. 2. We will continue all current medications without changes. 3. Urinalysis  4. Checking urine microalbumin/creatinine ratio. 5. We will see the patient back in 1 months.       _________________________________  Angelene Sever.  Harman Cruz, DO  [facility]      Teresita Centeno MD

## 2021-04-27 NOTE — PATIENT INSTRUCTIONS
KNOW YOUR KIDNEY NUMBERS    Your kidney speed (eGFR) was 36 ml/min this visit (normal is  ml/min)(Ml/min=milliliters of blood filtered per minute). The higher this number is, the better your kidney function is. Your serum creatinine was 1.9 (normal 0.8-1.2 mg/dl at most labs). The higher this number is, the worse your kidney function is. You are in stage G-IIIB-A1 of chronic kidney disease. Your kidney function has decelined as compared to your last visit. Your last eGFR was  60 Ml/Min. Stages of kidney disease    EGFR (estimated glomerular filtration rate)    G-I > 90 ml/min Kidney damage with normal kidney function (blood or protein in the urine)  G-II 60-89 ml/min Normal kidney function with mild damage with or without blood or protein in the urine  G-IIIA 45-59 ml/min Mild to moderate loss of kidney function. G-IIIB 30-44 ml/min Moderate to severe loss of kidney function  G-IV 15-29 ml/min Severe loss of kidney function  G-V < 15 mlmin     May need dialysis or kidney transplant    ACR (urine albumin/creatinine ratio) (Mg/Gm)    A-1      ACR<30 Normal to mildly increased protein in the urine. A-2 ACR  Moderate increase in urine protein loss. A-3 ACR >300 Severe increase in urine protein loss    Our goal is to keep your eGFR going as fast as possible ( ml/min is normal). If your eGFR declines to 15-24 ml/min and stays there without recovery,  we will begin to educate you about dialysis or kidney transplant. We also want to keep the protein in your urine as low as possible. The leading cause of kidney disease in the world is diabetes mellitus. Keep your sugar  as much as possible. The second leading cause is hypertension. Keep Your blood pressure 120-140/70-80 as much as possible. If you need refills, call the office or your drug store. You may call the office any time with any questions or concerns.     DUE TO THE CORONAVIRUS CONCERN, PLEASE LIMIT YOUR TIME IN

## 2021-05-06 PROBLEM — R05.9 COUGH: Status: RESOLVED | Noted: 2021-04-06 | Resolved: 2021-05-06

## 2021-05-26 LAB
BASOPHILS ABSOLUTE: NORMAL
BASOPHILS RELATIVE PERCENT: NORMAL
BILIRUBIN, URINE: NEGATIVE
BLOOD, URINE: NEGATIVE
BUN BLDV-MCNC: 26 MG/DL
CALCIUM SERPL-MCNC: 9.3 MG/DL
CHLORIDE BLD-SCNC: 103 MMOL/L
CLARITY: CLEAR
CO2: 25 MMOL/L
COLOR: YELLOW
CREAT SERPL-MCNC: 1.8 MG/DL
CREATININE, URINE: 78.9
EOSINOPHILS ABSOLUTE: NORMAL
EOSINOPHILS RELATIVE PERCENT: NORMAL
GFR CALCULATED: 38
GLUCOSE BLD-MCNC: 100 MG/DL
GLUCOSE URINE: NEGATIVE
HCT VFR BLD CALC: 43.2 % (ref 41–53)
HEMOGLOBIN: 13.5 G/DL (ref 13.5–17.5)
KETONES, URINE: NORMAL
LEUKOCYTE ESTERASE, URINE: NEGATIVE
LYMPHOCYTES ABSOLUTE: NORMAL
LYMPHOCYTES RELATIVE PERCENT: NORMAL
MCH RBC QN AUTO: NORMAL PG
MCHC RBC AUTO-ENTMCNC: NORMAL G/DL
MCV RBC AUTO: NORMAL FL
MICROALBUMIN/CREAT 24H UR: 3.3 MG/G{CREAT}
MICROALBUMIN/CREAT UR-RTO: 4.2
MONOCYTES ABSOLUTE: NORMAL
MONOCYTES RELATIVE PERCENT: NORMAL
NEUTROPHILS ABSOLUTE: NORMAL
NEUTROPHILS RELATIVE PERCENT: NORMAL
NITRITE, URINE: NEGATIVE
PH UA: 6 (ref 4.5–8)
PLATELET # BLD: 159 K/ΜL
PMV BLD AUTO: NORMAL FL
POTASSIUM SERPL-SCNC: 4.1 MMOL/L
PROTEIN UA: NEGATIVE
RBC # BLD: 4.45 10^6/ΜL
SODIUM BLD-SCNC: 143 MMOL/L
SPECIFIC GRAVITY, URINE: 1.01
UROBILINOGEN, URINE: NORMAL
WBC # BLD: 6.5 10^3/ML

## 2021-06-08 ENCOUNTER — OFFICE VISIT (OUTPATIENT)
Dept: NEPHROLOGY | Age: 86
End: 2021-06-08
Payer: MEDICARE

## 2021-06-08 VITALS
WEIGHT: 211 LBS | DIASTOLIC BLOOD PRESSURE: 48 MMHG | BODY MASS INDEX: 27.96 KG/M2 | SYSTOLIC BLOOD PRESSURE: 96 MMHG | OXYGEN SATURATION: 97 % | HEART RATE: 61 BPM | RESPIRATION RATE: 18 BRPM | HEIGHT: 73 IN

## 2021-06-08 DIAGNOSIS — N18.32 STAGE 3B CHRONIC KIDNEY DISEASE (HCC): Primary | ICD-10-CM

## 2021-06-08 PROCEDURE — G8417 CALC BMI ABV UP PARAM F/U: HCPCS | Performed by: INTERNAL MEDICINE

## 2021-06-08 PROCEDURE — 1123F ACP DISCUSS/DSCN MKR DOCD: CPT | Performed by: INTERNAL MEDICINE

## 2021-06-08 PROCEDURE — 4040F PNEUMOC VAC/ADMIN/RCVD: CPT | Performed by: INTERNAL MEDICINE

## 2021-06-08 PROCEDURE — 1036F TOBACCO NON-USER: CPT | Performed by: INTERNAL MEDICINE

## 2021-06-08 PROCEDURE — G8427 DOCREV CUR MEDS BY ELIG CLIN: HCPCS | Performed by: INTERNAL MEDICINE

## 2021-06-08 PROCEDURE — 99214 OFFICE O/P EST MOD 30 MIN: CPT | Performed by: INTERNAL MEDICINE

## 2021-06-08 NOTE — PROGRESS NOTES
[facility]    [address] [facility]  [address], [address]  [phone]       Progress Note    6/8/2021 2:45 PM    Pt Name:    Dusty Contreras  YOB: 1931  Primary Care Physician:  Gustavo Fonseca MD       Chief Complaint:   Chief Complaint   Patient presents with    Chronic Kidney Disease    Anemia    Nephropathy    Proteinuria     nephrotic syndrome        History of Chief Complaint: CKD stage G-IIIA-A1  from nephrotic syndrome and nephrolithiasis      Subjective:  I last saw the patient in clinic 04/27/2021. I follow the patient for Chronic Kidney disease stage G-IIIA-A1. Since our last visit the patient has not been hospitalized. The patient is sleeping well at night with 1-2 times per night nocturia. The patient has a good appetite and is remaining active. The patient denied N/V/C/D/SOB/CP. He has no trouble at bladder emptying. He complains of weakness and tiredness. His blood pressure has been low. I noticed that he takes amiodarone and midodrine. I spoke with Dr. Maday Domingo. We will reduce the amiodarone to 100 mg oral Mon, Wed, and Fri. We will reduce the metoprolol to 12.5 mg oral every 12 hours.      COVID-19 screening  Fever: none  Cough: none  Exposure: none  Shortness of breath: none    Protein/creatinine ratio: 4.2  eGFR: 38 Ml/min (it was 36 Ml/Min last visit)  SCr: 1.8 Mg/Dl (It was 1.9 Mg/Dl last visit)      Last six eGFR readings:  Lab Results   Component Value Date    LABGLOM 38 05/26/2021    LABGLOM 36 04/19/2021    LABGLOM 60 12/30/2020    LABGLOM 43 09/28/2020    LABGLOM 47 09/15/2020    LABGLOM 51 05/19/2020          Objective:  VITALS:  BP (!) 96/48 (Site: Left Upper Arm, Position: Sitting, Cuff Size: Small Adult)   Pulse 61   Resp 18   Ht 6' 1\" (1.854 m)   Wt 211 lb (95.7 kg)   SpO2 97%   BMI 27.84 kg/m²   Weight:   Wt Readings from Last 3 Encounters:   06/08/21 211 lb (95.7 kg)   04/27/21 225 lb (102.1 kg)   04/06/21 220 lb (99.8 kg) Body mass index is 27.84 kg/m². Physical examination    General:  Alert and cooperative with exam  HEENT:  Normocephalic. No lesions nor rashes. JERO. EOMI  Neck:   No JVD and no bruits. Thyroid gland is normal  Lungs:  Breathing easily. No rales nor rhonchi. No cough nor sputum production. Heart[de-identified]            RRR. No murmurs nor rubs. PMI is not enlarged nor displaced. Abdomen:  Soft and non tender. Bowel sounds are active in all four quadrants. Extremities:  1+ edema  Neurologic:  CN II-XII are intact. No deficits noted. Muscle strength and tone are equal throughout. Skin:                Warm and dry with no rashes. Muscles:         Hand  and leg strength are equal and strong bilaterally.      Lab Data      CBC:   Lab Results   Component Value Date    WBC 6.50 05/26/2021    HGB 13.5 05/26/2021    HCT 43.2 05/26/2021    MCV 97.8 04/19/2021     05/26/2021     BMP:    Lab Results   Component Value Date     05/26/2021     04/19/2021     12/30/2020    K 4.1 05/26/2021    K 4.4 04/19/2021    K 4.2 12/30/2020     05/26/2021     04/19/2021     12/30/2020    CO2 25 05/26/2021    CO2 28 04/19/2021    CO2 25 12/30/2020    BUN 26 05/26/2021    BUN 25 04/19/2021    BUN 27 12/30/2020    CREATININE 1.8 05/26/2021    CREATININE 1.9 04/19/2021    CREATININE 1.27 12/30/2020    GLUCOSE 100 05/26/2021    GLUCOSE 95 04/19/2021    GLUCOSE 119 12/30/2020      Hepatic:   Lab Results   Component Value Date    AST 33 09/28/2020    AST 26 06/17/2020    AST 24 04/07/2020    ALT 39 09/28/2020    ALT 26 06/17/2020    ALT 26 04/07/2020    BILITOT 0.8 09/28/2020    BILITOT 1.1 06/17/2020    BILITOT 1.2 04/07/2020    ALKPHOS 133 09/28/2020    ALKPHOS 117 06/17/2020    ALKPHOS 105 04/07/2020     BNP: No results found for: BNP  Lipids:   Lab Results   Component Value Date    CHOL 124 09/28/2020    HDL 53 09/28/2020     INR: No results found for: INR  URINE: No results found for: Kashif Bell Mon, Wed, and Fri. We will reduce the metoprolol to 12.5 mg oral every 12 hours. 3. Checking urine microalbumin/creatinine ratio. 4. We will see the patient back in 2 months.       _________________________________  Laddie Human.  Kamar Peña DO  [facility]      Lencho Langley MD

## 2021-06-08 NOTE — PATIENT INSTRUCTIONS
KNOW YOUR KIDNEY NUMBERS    Your kidney speed (eGFR) was 38 ml/min this visit (normal is  ml/min)(Ml/min=milliliters of blood filtered per minute). The higher this number is, the better your kidney function is. Your serum creatinine was 1.8 (normal 0.8-1.2 mg/dl at most labs). The higher this number is, the worse your kidney function is. You are in stage G-IIIB-A1 of chronic kidney disease. Your kidney function has improved as compared to your last visit. Your last eGFR was  36 Ml/Min. Stages of kidney disease    EGFR (estimated glomerular filtration rate)    G-I > 90 ml/min Kidney damage with normal kidney function (blood or protein in the urine)  G-II 60-89 ml/min Normal kidney function with mild damage with or without blood or protein in the urine  G-IIIA 45-59 ml/min Mild to moderate loss of kidney function. G-IIIB 30-44 ml/min Moderate to severe loss of kidney function  G-IV 15-29 ml/min Severe loss of kidney function  G-V < 15 mlmin     May need dialysis or kidney transplant    ACR (urine albumin/creatinine ratio) (Mg/Gm)    A-1      ACR<30 Normal to mildly increased protein in the urine. A-2 ACR  Moderate increase in urine protein loss. A-3 ACR >300 Severe increase in urine protein loss    Our goal is to keep your eGFR going as fast as possible ( ml/min is normal). If your eGFR declines to 15-24 ml/min and stays there without recovery,  we will begin to educate you about dialysis or kidney transplant. We also want to keep the protein in your urine as low as possible. The leading cause of kidney disease in the world is diabetes mellitus. Keep your sugar  as much as possible. The second leading cause is hypertension. Keep Your blood pressure 120-140/70-80 as much as possible. If you need refills, call the office or your drug store. You may call the office any time with any questions or concerns.     DUE TO THE CORONAVIRUS CONCERN, PLEASE LIMIT YOUR TIME IN PUBLIC. [address] YOUR HANDS COMPLETELY AND FREQUENTLY. I spoke with Dr. Bhavya Javier. We will reduce the amiodarone to 100 mg oral Mon, Wed, and Fri. We will reduce the metoprolol to 12.5 mg oral every 12 hours. Dinora Rodriguez

## 2021-07-08 ENCOUNTER — INITIAL CONSULT (OUTPATIENT)
Dept: PULMONOLOGY | Age: 86
End: 2021-07-08
Payer: MEDICARE

## 2021-07-08 VITALS
BODY MASS INDEX: 27.43 KG/M2 | SYSTOLIC BLOOD PRESSURE: 118 MMHG | WEIGHT: 207 LBS | OXYGEN SATURATION: 96 % | HEART RATE: 75 BPM | TEMPERATURE: 97.7 F | HEIGHT: 73 IN | DIASTOLIC BLOOD PRESSURE: 72 MMHG

## 2021-07-08 DIAGNOSIS — I48.0 PAROXYSMAL A-FIB (HCC): ICD-10-CM

## 2021-07-08 DIAGNOSIS — G47.30 SLEEP APNEA, UNSPECIFIED TYPE: Primary | ICD-10-CM

## 2021-07-08 DIAGNOSIS — I10 ESSENTIAL HYPERTENSION: ICD-10-CM

## 2021-07-08 DIAGNOSIS — I25.10 CORONARY ARTERY DISEASE INVOLVING NATIVE CORONARY ARTERY OF NATIVE HEART WITHOUT ANGINA PECTORIS: ICD-10-CM

## 2021-07-08 PROCEDURE — 1123F ACP DISCUSS/DSCN MKR DOCD: CPT | Performed by: INTERNAL MEDICINE

## 2021-07-08 PROCEDURE — 4040F PNEUMOC VAC/ADMIN/RCVD: CPT | Performed by: INTERNAL MEDICINE

## 2021-07-08 PROCEDURE — G8427 DOCREV CUR MEDS BY ELIG CLIN: HCPCS | Performed by: INTERNAL MEDICINE

## 2021-07-08 PROCEDURE — 1036F TOBACCO NON-USER: CPT | Performed by: INTERNAL MEDICINE

## 2021-07-08 PROCEDURE — 99204 OFFICE O/P NEW MOD 45 MIN: CPT | Performed by: INTERNAL MEDICINE

## 2021-07-08 PROCEDURE — G8417 CALC BMI ABV UP PARAM F/U: HCPCS | Performed by: INTERNAL MEDICINE

## 2021-07-08 NOTE — PROGRESS NOTES
Chief Complaint: Lou Clement is a new sleep consult referred by Beto, psg 3/24/21    Mallampati airway Class:3  Neck Circumference:.15.5 Inches    Meservey sleepiness score 7/8/21:   Sleep Apnea Quality of life questionnaire:.85

## 2021-07-08 NOTE — PROGRESS NOTES
[facility] initial consultation note    David Joyner                                                Chief complaint: David Joyner is a 80 y. o.oldmale came for further evaluation regarding his mild sleep apnea  with referral from [address], DO . [place]:    Sleep/Wake schedule:  Usual time to go to bed during the work/regular day of week: 8:30 PM  Usual time to wake up during the work//regular day of week: Varies from 3:30 AM to 7 AM  Over the weekends his sleep schedule: [x] Remain same. He usually falls a sleep in less than: 10 to 30 minutes. He takes naps: No.     Sleep Hygiene:    Is the temperature and evironment in his bed room is acceptable to him: Yes. He watches Television in his bed room: Yes. He read books, study, pay bills etc in the bed: No.  Frequency He wake up during night/sleep: 1  Majority of nocturnal awakenings are for urination: No.    Difficulty in falling back to sleep after nocturnal awakenings: Yes    Do you drink coffee: Yes. 1 to 2 cups per week. Do you drink caffeinated beverages i.e sodas: Yes. 1 can/s per day. Coke  Do you drink tea: No.     Do you drink alcoholic beverages: Yes. 1 to 2drink/s per . History of recreational drug use: No.     History of tobacco smoking:Yes. Amount of tobacco smoking: He used to smoke 2 cigars/day. He never smoked cigarettes  Years of tobacco smokin                                  Quit smoking: Yes. Quit year: He quit smoking 40 years back  Current smoker: No.           Sleep apnea symptoms:  History of choking and gasping sensation at night time: No.  History of headaches in the morning:No.  History of dry mouth in the morning: Yes. History of palpitations during night time/nocturnal awakenings: No.  History of sweating during night time/nocturnal awakenings: No    General:  History of head injury in the past: No.  .   History of seizures: NO.  Rest less legs syndrome symptoms:NO  History suggestive of periodic limb movements during sleep: NO  History suggestive of hypnagogic hallucinations: NO  History suggestive of hypnopompic hallucinations: NO  History suggestive of sleep talking: NO  History suggestive of sleep walking:NO  History suggestive of bruxism: NO     History suggestive of cataplexy: NO  History suggestive of sleep paralysis:NO    Family history of sleep disorders:  Family history of obstructive sleep apnea: NO.  Family history of Narcolepsy: NO.  Family history of Rest less legs syndrome : NO.      History regarding old sleep studies:  Prior history of sleep study: Yes. Please see the diagnostic data section for details. Using CPAP device: No.  Currently using home Oxygen: NO.      Patient considerations:  Is the patient is ambulatory: Yes  Patient is currently using: None of these Wheelchair, 4Home or ViFlux. Para/Quadriplegic: NO  Hearing deficit : NO  Claustrophobic: NO  MDD : NO  Blind: NO  Incontinent: NO  Para/Quadraplegi: NO.   Need transportation to and from Sleep Center:NO    Social History:  Social History     Tobacco Use    Smoking status: Former Smoker     Years: 20.00     Types: Pipe, Cigars     Quit date: 5/10/1973     Years since quittin.1    Smokeless tobacco: Never Used   Substance Use Topics    Alcohol use: Yes     Alcohol/week: 17.5 standard drinks     Types: 21 Standard drinks or equivalent per week     Comment: OCCASIONAL    Drug use: No   .  He is currently working: No.       [x] Retired.   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                       Past Medical History:   Diagnosis Date    CKD (chronic kidney disease), stage III ([address]     Eczema     and rosacea    Gastric ulcer 2021    H/O bronchitis     H/O cataract     H/O transfusion of whole blood     Heart disease     Hyperlipemia     Iron deficiency anemia     Kidney stone 2019    [facility]    Nephrolithiasis     Nephrotic syndrome     Proteinuria     SOB (shortness of breath)        Past Surgical History:   Procedure Laterality Date    COLONOSCOPY  2003    CORONARY ANGIOPLASTY  9/18, 11/18    times 4 Dr. Laatsha Quan  09/2018    EGD  2021    LITHOTRIPSY      s/p extracorporeal shock wave lithotripsy       Allergies   Allergen Reactions    Benadryl Allergy  [Diphenhydramine Hcl]      Other reaction(s): confusion    Duloxetine      Other reaction(s): Other - comment required  Other reaction(s): Unknown    Penicillin G Sodium      Other reaction(s): Unknown    Penicillins      Other reaction(s): Other - comment required  Unknown Rxn  Other reaction(s): AOF  Other reaction(s): AOF  Other reaction(s): AOF  Other reaction(s): Unknown       Current Outpatient Medications   Medication Sig Dispense Refill    amiodarone (CORDARONE) 200 MG tablet Take 200 mg by mouth daily Mon,wed fri      aspirin 81 MG EC tablet Take 81 mg by mouth daily      famotidine (PEPCID) 20 MG tablet Take 1 tablet by mouth daily 90 tablet 3    CVS VITAMIN D3 250 MCG (04995 UT) CAPS capsule TAKE 1 CAPSULE BY MOUTH EVERY DAY      midodrine (PROAMATINE) 10 MG tablet Take 10 mg by mouth 3 times daily      tamsulosin (FLOMAX) 0.4 MG capsule Take 0.4 mg by mouth daily      senna (SENOKOT) 8.6 MG tablet Take 1 tablet by mouth daily      docusate sodium (COLACE) 100 MG capsule Take 100 mg by mouth daily       atorvastatin (LIPITOR) 40 MG tablet Take 40 mg by mouth daily      metoprolol tartrate (LOPRESSOR) 25 MG tablet 25 mg 2 times daily   0    nitroGLYCERIN (NITROSTAT) 0.4 MG SL tablet       IBUPROFEN PO Take by mouth as needed      Multiple Vitamins-Minerals (PRESERVISION AREDS PO) Take  by mouth 2 times daily. No current facility-administered medications for this visit.        Family History   Problem Relation Age of Onset    Colon Cancer Father 66    Normal range of motion. Extremities: Patient exhibits no edema and no tenderness. Lymphadenopathy:  No cervical adenopathy. Neurological: Patient is alert and oriented to person, place, and time. Skin: Skin is warm and dry. Patient is not diaphoretic. Psychiatric: Patient  has a normal mood and affect. Patient behavior is normal.     Diagnostic Data:      Sleep test: Performed on 24 March 2021                                                      Assessment:  -Mild obstructive sleep apnea diagnosed by a sleep study performed on 21 November 2010. He never underwent treatment. He underwent repeat sleep study on 24 March 2021. The repeat sleep study performed on 24 March 2021 confirmed is mild obstructive sleep apnea with an AHI of 8.6. Patient had a maximal oxygen desaturation to 85%. His oxygen saturation remained less than 88% for repeat of 2.1-minute. Patient never underwent treatment for his mild obstructive sleep apnea in the past.  On further questioning patient still did not want to go for any treatment for his mild obstructive sleep apnea. Patient repeatedly told me that he is 89 years did not want to go for any treatment for his mild sleep apnea. He is aware of the consequence of his decision including the increased risk for cerebrovascular and cardiovascular morbidity and mortality leading to death.  -Inadequate sleep hygiene.   -Chronic kidney disease stage III. -Paroxysmal atrial fibrillation with controlled medical response. He is on treatment with amiodarone  -Coronary artery disease  -Essential hypertension under control with medication treatment.       Recommendations/Plan:  -I had a discussion with patient regarding avialable treatment options for his sleep disorder breathing including but not limited to CPAP titration in the sleep lab Vs.Dental appliance placement with referral to a local dentist Vs other available surgical options including Uvulopalatopharyngoplasty, maxillomandibular ostomy and tracheostomy as last option. At the end of discussion, he decided to not to go on any treatment for his mild obstructive sleep apnea in view of his associated comorbidities. He repeatedly told me that he is 89 years and he did not want to go through any treatment. He came to my clinic today at the request of his daughter only. However, there is no family member accompanied him today for his clinic visit with my clinic.  -He is aware of the consequences of his decision including increased risk for cardiovascular and cerebrovascular events and morbidity including death.  -He was advised to practice good sleep hygiene techniques. He was provided with a hand out.  -He was advised to loose weight by controlling diet and doing exercise once cleared by his cardiologist.  -Schedule patient for follow up with my clinic on PRN/As needed basis for sleep related issues. Patient to follow with his family physician and his cardiologist Dr. Hugo Walker, DO closely.   -He was advised call my office for earlier appointment if needed for worsening of sleep symptoms.  -Karina Pean educated about my impression and plan. Patient verbalizes understanding.      -I personally reviewed updated the Past medical hx, Past surgical hx,Social hx, Family hx, Medications, Allergies in the discrete data section of the patient chart along with labs, Pulmonary medicine,Sleep medicine related, Pathological, Microbiological and Radiological investigations.

## 2021-08-03 LAB
ALBUMIN SERPL-MCNC: 3.1 G/DL
ALP BLD-CCNC: 100 U/L
ALT SERPL-CCNC: 25 U/L
ANION GAP SERPL CALCULATED.3IONS-SCNC: 12 MMOL/L
AST SERPL-CCNC: 17 U/L
BASOPHILS ABSOLUTE: ABNORMAL
BASOPHILS RELATIVE PERCENT: ABNORMAL
BILIRUB SERPL-MCNC: 1 MG/DL (ref 0.1–1.4)
BUN BLDV-MCNC: 33 MG/DL
CALCIUM SERPL-MCNC: 8.8 MG/DL
CHLORIDE BLD-SCNC: 107 MMOL/L
CO2: 25 MMOL/L
CREAT SERPL-MCNC: 1.5 MG/DL
EOSINOPHILS ABSOLUTE: ABNORMAL
EOSINOPHILS RELATIVE PERCENT: ABNORMAL
GFR CALCULATED: 47
GLUCOSE BLD-MCNC: 110 MG/DL
HCT VFR BLD CALC: 37.6 % (ref 41–53)
HEMOGLOBIN: 12.1 G/DL (ref 13.5–17.5)
LYMPHOCYTES ABSOLUTE: ABNORMAL
LYMPHOCYTES RELATIVE PERCENT: ABNORMAL
MCH RBC QN AUTO: ABNORMAL PG
MCHC RBC AUTO-ENTMCNC: ABNORMAL G/DL
MCV RBC AUTO: ABNORMAL FL
MONOCYTES ABSOLUTE: ABNORMAL
MONOCYTES RELATIVE PERCENT: ABNORMAL
NEUTROPHILS ABSOLUTE: ABNORMAL
NEUTROPHILS RELATIVE PERCENT: ABNORMAL
PLATELET # BLD: 138 K/ΜL
PMV BLD AUTO: ABNORMAL FL
POTASSIUM SERPL-SCNC: 4.1 MMOL/L
RBC # BLD: 3.97 10^6/ΜL
SODIUM BLD-SCNC: 140 MMOL/L
TOTAL PROTEIN: 6.8
WBC # BLD: 13.37 10^3/ML

## 2021-08-06 LAB
CREATININE, URINE: 119.5
MICROALBUMIN/CREAT 24H UR: 10.8 MG/G{CREAT}
MICROALBUMIN/CREAT UR-RTO: 9

## 2021-08-10 ENCOUNTER — OFFICE VISIT (OUTPATIENT)
Dept: NEPHROLOGY | Age: 86
End: 2021-08-10
Payer: MEDICARE

## 2021-08-10 VITALS
HEART RATE: 65 BPM | SYSTOLIC BLOOD PRESSURE: 88 MMHG | HEIGHT: 73 IN | DIASTOLIC BLOOD PRESSURE: 48 MMHG | RESPIRATION RATE: 18 BRPM | OXYGEN SATURATION: 97 % | BODY MASS INDEX: 27.96 KG/M2 | WEIGHT: 211 LBS

## 2021-08-10 DIAGNOSIS — N04.9 NEPHROTIC SYNDROME: ICD-10-CM

## 2021-08-10 DIAGNOSIS — N18.32 STAGE 3B CHRONIC KIDNEY DISEASE (HCC): Primary | ICD-10-CM

## 2021-08-10 PROCEDURE — G8427 DOCREV CUR MEDS BY ELIG CLIN: HCPCS | Performed by: INTERNAL MEDICINE

## 2021-08-10 PROCEDURE — 99214 OFFICE O/P EST MOD 30 MIN: CPT | Performed by: INTERNAL MEDICINE

## 2021-08-10 PROCEDURE — G8417 CALC BMI ABV UP PARAM F/U: HCPCS | Performed by: INTERNAL MEDICINE

## 2021-08-10 PROCEDURE — 1123F ACP DISCUSS/DSCN MKR DOCD: CPT | Performed by: INTERNAL MEDICINE

## 2021-08-10 PROCEDURE — 4040F PNEUMOC VAC/ADMIN/RCVD: CPT | Performed by: INTERNAL MEDICINE

## 2021-08-10 PROCEDURE — 1036F TOBACCO NON-USER: CPT | Performed by: INTERNAL MEDICINE

## 2021-08-10 NOTE — PATIENT INSTRUCTIONS
KNOW YOUR KIDNEY NUMBERS    Your kidney speed (eGFR) was 47 ml/min this visit (normal is  ml/min)(Ml/min=milliliters of blood filtered per minute). The higher this number is, the better your kidney function is. Your serum creatinine was 1.5 (normal 0.8-1.2 mg/dl at most labs). The higher this number is, the worse your kidney function is. You are in stage G-IIIB-A3 of chronic kidney disease. Your kidney function has improved as compared to your last visit. Your last eGFR was  38 Ml/Min. Stages of kidney disease    EGFR (estimated glomerular filtration rate)    G-I > 90 ml/min Kidney damage with normal kidney function (blood or protein in the urine)  G-II 60-89 ml/min Normal kidney function with mild damage with or without blood or protein in the urine  G-IIIA 45-59 ml/min Mild to moderate loss of kidney function. G-IIIB 30-44 ml/min Moderate to severe loss of kidney function  G-IV 15-29 ml/min Severe loss of kidney function  G-V < 15 mlmin     May need dialysis or kidney transplant    ACR (urine albumin/creatinine ratio) (Mg/Gm)    A-1      ACR<30 Normal to mildly increased protein in the urine. A-2 ACR  Moderate increase in urine protein loss. A-3 ACR >300 Severe increase in urine protein loss    Our goal is to keep your eGFR going as fast as possible ( ml/min is normal). If your eGFR declines to 15-24 ml/min and stays there without recovery,  we will begin to educate you about dialysis or kidney transplant. We also want to keep the protein in your urine as low as possible. The leading cause of kidney disease in the world is diabetes mellitus. Keep your sugar  as much as possible. The second leading cause is hypertension. Keep Your blood pressure 120-140/70-80 as much as possible. If you need refills, call the office or your drug store. You may call the office any time with any questions or concerns.     DUE TO THE CORONAVIRUS CONCERN, PLEASE LIMIT YOUR TIME IN PUBLIC. [address] YOUR HANDS COMPLETELY AND FREQUENTLY. Rodolfo Ranks  Anatoliy Wick

## 2021-08-10 NOTE — PROGRESS NOTES
[facility]    [address]  [address], [address]  [phone]       Progress Note    8/10/2021 1:55 PM    Pt Name:    Eros Robledo  YOB: 1931  Primary Care Physician:  Rolanda Santos MD       Chief Complaint:   Chief Complaint   Patient presents with    Chronic Kidney Disease    Nephrolithiasis    Proteinuria     Nephrotic syndrome-declining workup    Other     chronic hypotension. History of Chief Complaint: CKD stage G-IIIA-A1  from nephrotic syndrome and nephrolithiasis      Subjective:  I last saw the patient in clinic 06/08/2021. I follow the patient for Chronic Kidney disease stage G-IIIB-A3. Since our last visit the patient has been hospitalized for gastric ulcers. He underwent endoscopy and transfusion. The patient is sleeping well at night with 1-2 times per night nocturia. The patient has a good appetite and is remaining active. The patient denied N/V/C/D/SOB/CP. He has no trouble at bladder emptying. His microalbumin/creatinine ratio has gotten worse. We spoke again abut kidney biopsy and again he declined. He went     COVID-19 screening  Fever: none  Cough: none  Exposure: none  Shortness of breath: none    Micro albumin/creatinine ratio: 9.0  eGFR: 47 Ml/min (it was 38 Ml/Min last visit)  SCr: 1.5 Mg/Dl (It was 1.8 Mg/Dl last visit)      Last six eGFR readings:  Lab Results   Component Value Date    LABGLOM 47 08/03/2021    LABGLOM 38 05/26/2021    LABGLOM 36 04/19/2021    LABGLOM 60 12/30/2020    LABGLOM 43 09/28/2020    LABGLOM 47 09/15/2020          Objective:  VITALS:  BP (!) 88/48 (Site: Right Upper Arm, Position: Sitting, Cuff Size: Large Adult)   Pulse 65   Resp 18   Ht 6' 1\" (1.854 m)   Wt 211 lb (95.7 kg)   SpO2 97%   BMI 27.84 kg/m²   Weight:   Wt Readings from Last 3 Encounters:   08/10/21 211 lb (95.7 kg)   07/08/21 207 lb (93.9 kg)   06/08/21 211 lb (95.7 kg)     Body mass index is 27.84 kg/m².      Physical examination    General:  Alert and cooperative with exam  HEENT:  Normocephalic. No lesions nor rashes. JERO. EOMI  Neck:   No JVD and no bruits. Thyroid gland is normal  Lungs:  Breathing easily. No rales nor rhonchi. No cough nor sputum production. Heart[de-identified]            RRR. No murmurs nor rubs. PMI is not enlarged nor displaced. Abdomen:  Soft and non tender. Bowel sounds are active in all four quadrants. Extremities:  2+ edema  Neurologic:  CN II-XII are intact. No deficits noted. Muscle strength and tone are equal throughout. Skin:                Warm and dry with no rashes. Muscles:         Hand  and leg strength are equal and strong bilaterally.      Lab Data      CBC:   Lab Results   Component Value Date    WBC 13.37 08/03/2021    HGB 12.1 (A) 08/03/2021    HCT 37.6 (A) 08/03/2021    MCV 97.8 04/19/2021     08/03/2021     BMP:    Lab Results   Component Value Date     08/03/2021     05/26/2021     04/19/2021    K 4.1 08/03/2021    K 4.1 05/26/2021    K 4.4 04/19/2021     08/03/2021     05/26/2021     04/19/2021    CO2 25 08/03/2021    CO2 25 05/26/2021    CO2 28 04/19/2021    BUN 33 08/03/2021    BUN 26 05/26/2021    BUN 25 04/19/2021    CREATININE 1.5 08/03/2021    CREATININE 1.8 05/26/2021    CREATININE 1.9 04/19/2021    GLUCOSE 110 08/03/2021    GLUCOSE 100 05/26/2021    GLUCOSE 95 04/19/2021      Hepatic:   Lab Results   Component Value Date    AST 17 08/03/2021    AST 33 09/28/2020    AST 26 06/17/2020    ALT 25 08/03/2021    ALT 39 09/28/2020    ALT 26 06/17/2020    BILITOT 1.0 08/03/2021    BILITOT 0.8 09/28/2020    BILITOT 1.1 06/17/2020    ALKPHOS 100 08/03/2021    ALKPHOS 133 09/28/2020    ALKPHOS 117 06/17/2020     BNP: No results found for: BNP  Lipids:   Lab Results   Component Value Date    CHOL 124 09/28/2020    HDL 53 09/28/2020     INR: No results found for: INR  URINE: No results found for: Angela Cote PROTKYLEIGH  Lab Results   Component Value Date NITRU Negative 05/26/2021    COLORU Yellow 05/26/2021    PHUR 6.0 05/26/2021    CLARITYU Clear 05/26/2021    LEUKOCYTESUR Negative 05/26/2021    UROBILINOGEN Normal 05/26/2021    BILIRUBINUR Negative 05/26/2021    BLOODU Negative 05/26/2021    GLUCOSEU negative 05/26/2021    KETUA  05/26/2021      Comment:      trace      Microalbumen/Creatinine ratio:  No components found for: RUCREAT        Medications:    Current Outpatient Medications   Medication Sig Dispense Refill    amiodarone (CORDARONE) 200 MG tablet Take 200 mg by mouth daily Mon,wed fri      aspirin 81 MG EC tablet Take 81 mg by mouth daily      famotidine (PEPCID) 20 MG tablet Take 1 tablet by mouth daily 90 tablet 3    CVS VITAMIN D3 250 MCG (04257 UT) CAPS capsule TAKE 1 CAPSULE BY MOUTH EVERY DAY      midodrine (PROAMATINE) 10 MG tablet Take 10 mg by mouth 3 times daily      tamsulosin (FLOMAX) 0.4 MG capsule Take 0.4 mg by mouth daily      senna (SENOKOT) 8.6 MG tablet Take 1 tablet by mouth daily      docusate sodium (COLACE) 100 MG capsule Take 100 mg by mouth daily       atorvastatin (LIPITOR) 40 MG tablet Take 40 mg by mouth daily      metoprolol tartrate (LOPRESSOR) 25 MG tablet 12.5 mg 2 times daily   0    nitroGLYCERIN (NITROSTAT) 0.4 MG SL tablet       IBUPROFEN PO Take by mouth as needed      Multiple Vitamins-Minerals (PRESERVISION AREDS PO) Take  by mouth 2 times daily. No current facility-administered medications for this visit. IMPRESSIONS:        Kidney disease: CKD stage G-IIIB-A3  Anemia: Anemia remains stable. No need for an erythrocyte stimulating agent (DIANE). Bone and mineral metabolism: There is no complaint of bone pain. PLAN:  1. We discussed the eGFR today. 2. We will continue all current medications without changes. 3. PLA2R  4. CMP  5. Checking urine microalbumin/creatinine ratio.   6. We will see the patient back in 2.5 months.       _________________________________  Tessie Roberts. Sol Acosta DO  [facility]      Sameer Vivar MD

## 2021-10-19 LAB
ALBUMIN SERPL-MCNC: 3.2 G/DL
ALP BLD-CCNC: 108 U/L
ALT SERPL-CCNC: 28 U/L
ALT SERPL-CCNC: 28 U/L
ANION GAP SERPL CALCULATED.3IONS-SCNC: 18 MMOL/L
AST SERPL-CCNC: 27 U/L
AST SERPL-CCNC: 27 U/L
BILIRUB SERPL-MCNC: 1.1 MG/DL (ref 0.1–1.4)
BUN BLDV-MCNC: 26 MG/DL
CALCIUM SERPL-MCNC: 8.7 MG/DL
CHLORIDE BLD-SCNC: 107 MMOL/L
CO2: 25 MMOL/L
CREAT SERPL-MCNC: 1.7 MG/DL
CREATININE, URINE: 177
GFR CALCULATED: 40
GLUCOSE BLD-MCNC: 101 MG/DL
MICROALBUMIN/CREAT 24H UR: 49.2 MG/G{CREAT}
MICROALBUMIN/CREAT UR-RTO: 27.8
POTASSIUM SERPL-SCNC: 3.9 MMOL/L
SODIUM BLD-SCNC: 145 MMOL/L
TOTAL PROTEIN: 6.9

## 2021-10-26 ENCOUNTER — OFFICE VISIT (OUTPATIENT)
Dept: NEPHROLOGY | Age: 86
End: 2021-10-26
Payer: MEDICARE

## 2021-10-26 VITALS
RESPIRATION RATE: 18 BRPM | SYSTOLIC BLOOD PRESSURE: 92 MMHG | BODY MASS INDEX: 28.49 KG/M2 | DIASTOLIC BLOOD PRESSURE: 42 MMHG | WEIGHT: 215 LBS | HEIGHT: 73 IN | HEART RATE: 71 BPM | OXYGEN SATURATION: 100 %

## 2021-10-26 DIAGNOSIS — N18.32 STAGE 3B CHRONIC KIDNEY DISEASE (HCC): Primary | ICD-10-CM

## 2021-10-26 PROCEDURE — 1123F ACP DISCUSS/DSCN MKR DOCD: CPT | Performed by: INTERNAL MEDICINE

## 2021-10-26 PROCEDURE — G8417 CALC BMI ABV UP PARAM F/U: HCPCS | Performed by: INTERNAL MEDICINE

## 2021-10-26 PROCEDURE — G8484 FLU IMMUNIZE NO ADMIN: HCPCS | Performed by: INTERNAL MEDICINE

## 2021-10-26 PROCEDURE — G8427 DOCREV CUR MEDS BY ELIG CLIN: HCPCS | Performed by: INTERNAL MEDICINE

## 2021-10-26 PROCEDURE — 1036F TOBACCO NON-USER: CPT | Performed by: INTERNAL MEDICINE

## 2021-10-26 PROCEDURE — 99214 OFFICE O/P EST MOD 30 MIN: CPT | Performed by: INTERNAL MEDICINE

## 2021-10-26 PROCEDURE — 4040F PNEUMOC VAC/ADMIN/RCVD: CPT | Performed by: INTERNAL MEDICINE

## 2021-10-26 NOTE — PROGRESS NOTES
[facility]    [address]  [address], [address]  [phone]       Progress Note    10/26/2021 9:14 AM    Pt Name:    Jaki Wang  YOB: 1931  Primary Care Physician:  Norma Ireland MD       Chief Complaint:   Chief Complaint   Patient presents with    Chronic Kidney Disease    Edema    Nephropathy    Proteinuria     Nephrotic syndrome declining workup    Other     chronic hypotension. History of Chief Complaint: CKD stage G-IIIA-A1  from nephrotic syndrome and nephrolithiasis      Subjective:  I last saw the patient in clinic 08/10/2021. I follow the patient for Chronic Kidney disease stage G-IIIB-A1. Since our last visit the patient has not been hospitalized. The patient is sleeping poorly at night (normal for him) with 1-2 times per night nocturia. The patient has a good appetite and is remaining active. The patient denied N/V/C/D/SOB/CP. He has no trouble at bladder emptying. Serum albumin was 3.2  AST=27 ALT-28. PLA2R was normal at 1.8. 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 COVID-19 screening  Fever: none  Cough: none  Exposure: none  Shortness of breath: none    Albumin/creatinine ratio: 27.8  eGFR: 40 Ml/min (it was 47 Ml/Min last visit)  SCr: 1.7 Mg/Dl (It was 1.5 Mg/Dl last visit)      Last six eGFR readings:  Lab Results   Component Value Date    LABGLOM 40 10/19/2021    LABGLOM 47 08/03/2021    LABGLOM 38 05/26/2021    LABGLOM 36 04/19/2021    LABGLOM 60 12/30/2020    LABGLOM 43 09/28/2020          Objective:  VITALS:  BP (!) 92/42 (Site: Right Upper Arm, Position: Sitting, Cuff Size: Large Adult)   Pulse 71   Resp 18   Ht 6' 1\" (1.854 m)   Wt 215 lb (97.5 kg)   SpO2 100%   BMI 28.37 kg/m²   Weight:   Wt Readings from Last 3 Encounters:   10/26/21 215 lb (97.5 kg)   08/10/21 211 lb (95.7 kg)   07/08/21 207 lb (93.9 kg)     Body mass index is 28.37 kg/m².      Physical examination    General:  Alert and cooperative with exam  HEENT:  Normocephalic. No lesions nor rashes. JERO. EOMI  Neck:   No JVD and no bruits. Thyroid gland is normal  Lungs:  Breathing easily. No rales nor rhonchi. No cough nor sputum production. Heart[de-identified]            RRR. No murmurs nor rubs. PMI is not enlarged nor displaced. Abdomen:  Soft and non tender. Bowel sounds are active in all four quadrants. Extremities:  2+ edema  Neurologic:  CN II-XII are intact. No deficits noted. Muscle strength and tone are equal throughout. Skin:                Warm and dry with no rashes. Muscles:         Hand  and leg strength are equal and strong bilaterally.      Lab Data      CBC:   Lab Results   Component Value Date    WBC 13.37 08/03/2021    HGB 12.1 (A) 08/03/2021    HCT 37.6 (A) 08/03/2021    MCV 97.8 04/19/2021     08/03/2021     BMP:    Lab Results   Component Value Date     10/19/2021     08/03/2021     05/26/2021    K 3.9 10/19/2021    K 4.1 08/03/2021    K 4.1 05/26/2021     10/19/2021     08/03/2021     05/26/2021    CO2 25 10/19/2021    CO2 25 08/03/2021    CO2 25 05/26/2021    BUN 26 10/19/2021    BUN 33 08/03/2021    BUN 26 05/26/2021    CREATININE 1.7 10/19/2021    CREATININE 1.5 08/03/2021    CREATININE 1.8 05/26/2021    GLUCOSE 101 10/19/2021    GLUCOSE 110 08/03/2021    GLUCOSE 100 05/26/2021      Hepatic:   Lab Results   Component Value Date    AST 27 10/19/2021    AST 27 10/19/2021    AST 17 08/03/2021    ALT 28 10/19/2021    ALT 28 10/19/2021    ALT 25 08/03/2021    BILITOT 1.1 10/19/2021    BILITOT 1.0 08/03/2021    BILITOT 0.8 09/28/2020    ALKPHOS 108 10/19/2021    ALKPHOS 100 08/03/2021    ALKPHOS 133 09/28/2020     BNP: No results found for: BNP  Lipids:   Lab Results   Component Value Date    CHOL 124 09/28/2020    HDL 53 09/28/2020     INR: No results found for: INR  URINE: No results found for: Lonell Coffin  Lab Results   Component Value Date    NITRU Negative 05/26/2021 COLORU Yellow 05/26/2021    PHUR 6.0 05/26/2021    CLARITYU Clear 05/26/2021    LEUKOCYTESUR Negative 05/26/2021    UROBILINOGEN Normal 05/26/2021    BILIRUBINUR Negative 05/26/2021    BLOODU Negative 05/26/2021    GLUCOSEU negative 05/26/2021    KETUA  05/26/2021      Comment:      trace      Microalbumen/Creatinine ratio:  No components found for: RUCREAT        Medications:    Current Outpatient Medications   Medication Sig Dispense Refill    amiodarone (CORDARONE) 200 MG tablet Take 200 mg by mouth daily Mon,wed fri      aspirin 81 MG EC tablet Take 81 mg by mouth daily      famotidine (PEPCID) 20 MG tablet Take 1 tablet by mouth daily 90 tablet 3    CVS VITAMIN D3 250 MCG (65052 UT) CAPS capsule TAKE 1 CAPSULE BY MOUTH EVERY DAY      midodrine (PROAMATINE) 10 MG tablet Take 10 mg by mouth 3 times daily      tamsulosin (FLOMAX) 0.4 MG capsule Take 0.4 mg by mouth daily      senna (SENOKOT) 8.6 MG tablet Take 1 tablet by mouth daily      docusate sodium (COLACE) 100 MG capsule Take 100 mg by mouth daily       atorvastatin (LIPITOR) 40 MG tablet Take 40 mg by mouth daily      metoprolol tartrate (LOPRESSOR) 25 MG tablet 12.5 mg 2 times daily   0    nitroGLYCERIN (NITROSTAT) 0.4 MG SL tablet       Multiple Vitamins-Minerals (PRESERVISION AREDS PO) Take  by mouth 2 times daily. No current facility-administered medications for this visit. IMPRESSIONS:        Kidney disease: CKD stage G-IIIB-A1  Anemia: Anemia remains stable. No need for an erythrocyte stimulating agent (DIANE). Bone and mineral metabolism: There is no complaint of bone pain. PLAN:  1. We discussed the eGFR today. 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 2. We will continue all current medications without changes. 3. In patients with CKD, amiodarone can accumulate in the circulation and cause hypotension. Will discuss with Dr Olivia Forbes about reducing the dose. 4. Checking protein/creatinine ratio.   5. We will see the patient back in 3 months.       _________________________________  Alexander Shelley.  Eddie Harper DO  [facility]      Krishna Stewart MD

## 2021-10-26 NOTE — PATIENT INSTRUCTIONS
KNOW YOUR KIDNEY NUMBERS    Your kidney speed (eGFR) was 40 ml/min this visit (normal is  ml/min)(Ml/min=milliliters of blood filtered per minute). The higher this number is, the better your kidney function is. Your serum creatinine was 1.7 (normal 0.8-1.2 mg/dl at most labs). The higher this number is, the worse your kidney function is. You are in stage G-IIIB-A1 of chronic kidney disease. Your kidney function has declined as compared to your last visit. Your last eGFR was  47 Ml/Min. Stages of kidney disease  EGFR (estimated glomerular filtration rate)  G-I > 90 ml/min Kidney damage with normal kidney function (blood or protein in the urine)  G-II 60-89 ml/min Normal kidney function with mild damage with or without blood or protein in the urine  G-IIIA 45-59 ml/min Mild to moderate loss of kidney function. G-IIIB 30-44 ml/min Moderate to severe loss of kidney function  G-IV 15-29 ml/min Severe loss of kidney function  G-V < 15 mlmin     May need dialysis or kidney transplant    ACR (urine albumin/creatinine ratio) (Mg/Gm)  A-1      ACR<30 Normal to mildly increased protein in the urine. A-2 ACR  Moderate increase in urine protein loss. A-3 ACR >300 Severe increase in urine protein loss    Our goal is to keep your eGFR going as fast as possible ( ml/min is normal). If your eGFR declines to 15-24 ml/min and stays there without recovery,  we will begin to educate you about dialysis or kidney transplant. We also want to keep the protein in your urine as low as possible. The leading cause of kidney disease in the world is diabetes mellitus. Keep your sugar  as much as possible. The second leading cause is hypertension. Keep Your blood pressure 120-140/70-80 as much as possible. If you need refills, call the office or your drug store. You may call the office any time with any questions or concerns. We use Epic software to manage your private patient data securely.  [facility]

## 2021-11-08 ENCOUNTER — TELEPHONE (OUTPATIENT)
Dept: NEPHROLOGY | Age: 86
End: 2021-11-08

## 2021-11-15 NOTE — TELEPHONE ENCOUNTER
I did speak with Dr. Clarence Escalona. He took many days to respond. I suggested reduction of amiodarone. This drug when given to patients with bad kidneys can accumulate in the circulation and lower the blood pressure. Dr. Clarence Escalona said that he would call the patient.   Eddie Harper

## 2022-01-06 LAB
A/G RATIO: 0.8
ALBUMIN SERPL-MCNC: 3.2 G/DL
ALP BLD-CCNC: 126 U/L
ALT SERPL-CCNC: 26 U/L
AST SERPL-CCNC: 32 U/L
BASOPHILS ABSOLUTE: ABNORMAL
BASOPHILS RELATIVE PERCENT: ABNORMAL
BILIRUB SERPL-MCNC: 1.8 MG/DL (ref 0.1–1.4)
BILIRUBIN DIRECT: ABNORMAL
BILIRUBIN, INDIRECT: ABNORMAL
BUN BLDV-MCNC: 20 MG/DL
CALCIUM SERPL-MCNC: 9.3 MG/DL
CHLORIDE BLD-SCNC: 103 MMOL/L
CO2: 30 MMOL/L
CREAT SERPL-MCNC: 1.5 MG/DL
EOSINOPHILS ABSOLUTE: ABNORMAL
EOSINOPHILS RELATIVE PERCENT: ABNORMAL
GFR CALCULATED: 47
GLOBULIN: 3.8
GLUCOSE BLD-MCNC: 100 MG/DL
HCT VFR BLD CALC: 39.9 % (ref 41–53)
HEMOGLOBIN: 12.4 G/DL (ref 13.5–17.5)
LYMPHOCYTES ABSOLUTE: ABNORMAL
LYMPHOCYTES RELATIVE PERCENT: ABNORMAL
MCH RBC QN AUTO: ABNORMAL PG
MCHC RBC AUTO-ENTMCNC: ABNORMAL G/DL
MCV RBC AUTO: ABNORMAL FL
MONOCYTES ABSOLUTE: ABNORMAL
MONOCYTES RELATIVE PERCENT: ABNORMAL
NEUTROPHILS ABSOLUTE: ABNORMAL
NEUTROPHILS RELATIVE PERCENT: ABNORMAL
PLATELET # BLD: 126 K/ΜL
PMV BLD AUTO: ABNORMAL FL
POTASSIUM SERPL-SCNC: 3.7 MMOL/L
PROTEIN TOTAL: 7 G/DL
RBC # BLD: 4.16 10^6/ΜL
SODIUM BLD-SCNC: 141 MMOL/L
WBC # BLD: 7.03 10^3/ML

## 2022-01-11 ENCOUNTER — OFFICE VISIT (OUTPATIENT)
Dept: NEPHROLOGY | Age: 87
End: 2022-01-11
Payer: MEDICARE

## 2022-01-11 VITALS
WEIGHT: 224 LBS | OXYGEN SATURATION: 98 % | BODY MASS INDEX: 29.69 KG/M2 | HEIGHT: 73 IN | SYSTOLIC BLOOD PRESSURE: 106 MMHG | DIASTOLIC BLOOD PRESSURE: 56 MMHG | HEART RATE: 102 BPM

## 2022-01-11 DIAGNOSIS — N18.32 STAGE 3B CHRONIC KIDNEY DISEASE (HCC): Primary | ICD-10-CM

## 2022-01-11 PROCEDURE — 4040F PNEUMOC VAC/ADMIN/RCVD: CPT | Performed by: INTERNAL MEDICINE

## 2022-01-11 PROCEDURE — 99213 OFFICE O/P EST LOW 20 MIN: CPT | Performed by: INTERNAL MEDICINE

## 2022-01-11 PROCEDURE — G8427 DOCREV CUR MEDS BY ELIG CLIN: HCPCS | Performed by: INTERNAL MEDICINE

## 2022-01-11 PROCEDURE — 1123F ACP DISCUSS/DSCN MKR DOCD: CPT | Performed by: INTERNAL MEDICINE

## 2022-01-11 PROCEDURE — 1036F TOBACCO NON-USER: CPT | Performed by: INTERNAL MEDICINE

## 2022-01-11 PROCEDURE — G8417 CALC BMI ABV UP PARAM F/U: HCPCS | Performed by: INTERNAL MEDICINE

## 2022-01-11 PROCEDURE — G8484 FLU IMMUNIZE NO ADMIN: HCPCS | Performed by: INTERNAL MEDICINE

## 2022-01-11 NOTE — PROGRESS NOTES
[address] [place] [address] [address] [address]  Dept: [phone]  Loc: [phone]  Progress Note  1/11/2022 11:06 AM      Pt Name:    Ismael Kaplan  YOB: 1931  Primary Care Physician:  Kole De MD     Chief Complaint:   Chief Complaint   Patient presents with    Follow-up     CKD III        History of Present Illness: This is a follow-up visit for CKD III. He is a former patient of Dr. Ayla Munoz, this is the first time I am seeing the patient. Baseline creatinine ranges 1.5-1.8 mg/dL. He has history of kidney stones and required surgical intervention in the past. Last imaging showed a small stone but he is asymptomatic. According to previous notes there is a Remote history of proteinuriaapparently was nephrotic range. This has resolved. Denies any history of renal biopsy. Other history includes CAD, Afib, COPD, chronic hypotension on Midodrine. Patient is overall doing well. Denies any recent hospitalizations. Denies any dizziness/lightheadeness. Amiodarone was reduced by Dr. Sylvain Aguilar last visit. Had some edema in legs which has improved with compression stockings. Pertinent items are noted in HPI.          Past History:  Past Medical History:   Diagnosis Date    CKD (chronic kidney disease), stage III ([address]     Eczema     and rosacea    Gastric ulcer 03/03/2021    H/O bronchitis     H/O cataract     H/O transfusion of whole blood     Heart disease     Hyperlipemia     Iron deficiency anemia     Kidney stone 01/2019    MI (myocardial infarction) ([address] 09/2018    Nephrolithiasis     Nephrotic syndrome     Proteinuria     SOB (shortness of breath)      Past Surgical History:   Procedure Laterality Date    COLONOSCOPY  2003    CORONARY ANGIOPLASTY  9/18, 11/18    times 4 Dr. Hull  09/2018    EGD  2021    LITHOTRIPSY s/p extracorporeal shock wave lithotripsy        VITALS:  BP (!) 106/56 (Site: Right Upper Arm, Position: Sitting, Cuff Size: Large Adult)   Pulse 102   Ht 6' 1\" (1.854 m)   Wt 224 lb (101.6 kg)   SpO2 98%   BMI 29.55 kg/m²   Wt Readings from Last 3 Encounters:   01/11/22 224 lb (101.6 kg)   10/26/21 215 lb (97.5 kg)   08/10/21 211 lb (95.7 kg)     Body mass index is 29.55 kg/m². General Appearance: alert and cooperative with exam, appears comfortable, no distress  HEENT: EOMI, moist oral mucus membranes  Neck: No jugular venous distention,   Lungs: Air entry B/L, no crackles or rales, no use of accessory muscles  Heart: S1, S2 heard, no rub  GI: soft, non-tender, no guarding,   Extremities: compression stockings, nonpitting edema  Skin: warm, dry  Neurologic: no tremor, no asterixis     Medications:  Current Outpatient Medications   Medication Sig Dispense Refill    amiodarone (CORDARONE) 200 MG tablet Take 200 mg by mouth daily Mon,wed fri      aspirin 81 MG EC tablet Take 81 mg by mouth daily      famotidine (PEPCID) 20 MG tablet Take 1 tablet by mouth daily 90 tablet 3    CVS VITAMIN D3 250 MCG (44727 UT) CAPS capsule TAKE 1 CAPSULE BY MOUTH EVERY DAY      midodrine (PROAMATINE) 10 MG tablet Take 10 mg by mouth 3 times daily      tamsulosin (FLOMAX) 0.4 MG capsule Take 0.4 mg by mouth daily      senna (SENOKOT) 8.6 MG tablet Take 1 tablet by mouth daily      docusate sodium (COLACE) 100 MG capsule Take 100 mg by mouth daily       atorvastatin (LIPITOR) 40 MG tablet Take 40 mg by mouth daily      metoprolol tartrate (LOPRESSOR) 25 MG tablet 12.5 mg 2 times daily   0    nitroGLYCERIN (NITROSTAT) 0.4 MG SL tablet       Multiple Vitamins-Minerals (PRESERVISION AREDS PO) Take  by mouth 2 times daily. No current facility-administered medications for this visit.         Laboratory & Diagnostics:  CBC:   Lab Results   Component Value Date    WBC 7.03 01/06/2022    HGB 12.4 (A) 01/06/2022 HCT 39.9 (A) 01/06/2022    MCV 97.8 04/19/2021     01/06/2022     BMP:    Lab Results   Component Value Date     01/06/2022     10/19/2021     08/03/2021    K 3.7 01/06/2022    K 3.9 10/19/2021    K 4.1 08/03/2021     01/06/2022     10/19/2021     08/03/2021    CO2 30 01/06/2022    CO2 25 10/19/2021    CO2 25 08/03/2021    BUN 20 01/06/2022    BUN 26 10/19/2021    BUN 33 08/03/2021    CREATININE 1.5 01/06/2022    CREATININE 1.7 10/19/2021    CREATININE 1.5 08/03/2021    GLUCOSE 100 01/06/2022    GLUCOSE 101 10/19/2021    GLUCOSE 110 08/03/2021      Hepatic:   Lab Results   Component Value Date    AST 32 01/06/2022    AST 27 10/19/2021    AST 27 10/19/2021    ALT 26 01/06/2022    ALT 28 10/19/2021    ALT 28 10/19/2021    BILITOT 1.8 (A) 01/06/2022    BILITOT 1.1 10/19/2021    BILITOT 1.0 08/03/2021    ALKPHOS 126 01/06/2022    ALKPHOS 108 10/19/2021    ALKPHOS 100 08/03/2021     BNP: No results found for: BNP  Lipids:   Lab Results   Component Value Date    CHOL 124 09/28/2020    HDL 53 09/28/2020     INR: No results found for: INR  URINE: No results found for: NAUR, PROTUR  Lab Results   Component Value Date    NITRU Negative 05/26/2021    COLORU Yellow 05/26/2021    PHUR 6.0 05/26/2021    CLARITYU Clear 05/26/2021    LEUKOCYTESUR Negative 05/26/2021    UROBILINOGEN Normal 05/26/2021    BILIRUBINUR Negative 05/26/2021    BLOODU Negative 05/26/2021    GLUCOSEU negative 05/26/2021    KETUA  05/26/2021      Comment:      trace      Microalbumen/Creatinine ratio:  No components found for: RUCREAT        Impression/Plan:   1. CKD III: renal function is overall stable. No significant proteinuria noted. 2. Hypotension, continue Midodrine  3. Nephrolithiasis  4. Afib  5. Diastolic CHF        Bloodwork and medications were reviewed and plan of care discussed with the patient. Return to clinic in 6 months  or sooner if the need arises.       Gianni Akers, DO  [facility]

## 2022-04-07 ENCOUNTER — TELEPHONE (OUTPATIENT)
Dept: NEPHROLOGY | Age: 87
End: 2022-04-07

## 2022-04-07 DIAGNOSIS — R31.0 GROSS HEMATURIA: Primary | ICD-10-CM

## 2022-04-07 NOTE — TELEPHONE ENCOUNTER
----- Message from Silvia Jerez DO sent at 4/7/2022  2:33 PM EDT -----  Definitely has blood in the urine but does not look like he has an infection. Could be from a kidney stone. Increase water intake over next few days.   Hopefully should resolve on its own but if he is not able to urinate due to a blood clot then needs to go to ED.   ----- Message -----  From: Augusta Kirkland CMA  Sent: 4/7/2022   9:57 AM EDT  To: Silvia Jerez DO

## 2022-07-07 LAB
BUN BLDV-MCNC: 21 MG/DL
CALCIUM SERPL-MCNC: 9.3 MG/DL
CHLORIDE BLD-SCNC: 103 MMOL/L
CO2: 30 MMOL/L
CREAT SERPL-MCNC: 1.9 MG/DL
CREATININE, URINE: 163.9
GFR CALCULATED: 35
GLUCOSE BLD-MCNC: 114 MG/DL
MICROALBUMIN/CREAT 24H UR: NORMAL MG/G{CREAT}
MICROALBUMIN/CREAT UR-RTO: 70
PHOSPHORUS: 3.3 MG/DL
POTASSIUM SERPL-SCNC: 3.7 MMOL/L
PTH INTACT: 118.7
SODIUM BLD-SCNC: 142 MMOL/L
VITAMIN D 25-HYDROXY: 73.8
VITAMIN D2, 25 HYDROXY: NORMAL
VITAMIN D3,25 HYDROXY: NORMAL

## 2022-07-12 ENCOUNTER — OFFICE VISIT (OUTPATIENT)
Dept: NEPHROLOGY | Age: 87
End: 2022-07-12
Payer: MEDICARE

## 2022-07-12 VITALS
DIASTOLIC BLOOD PRESSURE: 50 MMHG | BODY MASS INDEX: 25.69 KG/M2 | HEART RATE: 74 BPM | OXYGEN SATURATION: 98 % | WEIGHT: 194.7 LBS | SYSTOLIC BLOOD PRESSURE: 86 MMHG

## 2022-07-12 DIAGNOSIS — I95.1 ORTHOSTATIC HYPOTENSION: ICD-10-CM

## 2022-07-12 DIAGNOSIS — N18.32 STAGE 3B CHRONIC KIDNEY DISEASE (HCC): Primary | ICD-10-CM

## 2022-07-12 PROCEDURE — 1036F TOBACCO NON-USER: CPT | Performed by: INTERNAL MEDICINE

## 2022-07-12 PROCEDURE — 99214 OFFICE O/P EST MOD 30 MIN: CPT | Performed by: INTERNAL MEDICINE

## 2022-07-12 PROCEDURE — G8417 CALC BMI ABV UP PARAM F/U: HCPCS | Performed by: INTERNAL MEDICINE

## 2022-07-12 PROCEDURE — G8427 DOCREV CUR MEDS BY ELIG CLIN: HCPCS | Performed by: INTERNAL MEDICINE

## 2022-07-12 PROCEDURE — 1123F ACP DISCUSS/DSCN MKR DOCD: CPT | Performed by: INTERNAL MEDICINE

## 2022-07-12 RX ORDER — BUMETANIDE 0.5 MG/1
0.5 TABLET ORAL DAILY
Qty: 90 TABLET | Refills: 1
Start: 2022-07-12 | End: 2022-10-10

## 2022-07-12 RX ORDER — LANOLIN ALCOHOL/MO/W.PET/CERES
400 CREAM (GRAM) TOPICAL DAILY
COMMUNITY
Start: 2022-05-31

## 2022-07-12 RX ORDER — BUMETANIDE 1 MG/1
1 TABLET ORAL DAILY
COMMUNITY
Start: 2022-05-13 | End: 2022-07-12 | Stop reason: SDUPTHER

## 2022-07-12 RX ORDER — PANTOPRAZOLE SODIUM 40 MG/1
40 TABLET, DELAYED RELEASE ORAL DAILY
COMMUNITY

## 2022-07-12 RX ORDER — POTASSIUM CHLORIDE 1500 MG/1
20 TABLET, FILM COATED, EXTENDED RELEASE ORAL ONCE
COMMUNITY
Start: 2022-07-10

## 2022-07-12 RX ORDER — FLUDROCORTISONE ACETATE 0.1 MG/1
0.1 TABLET ORAL 2 TIMES DAILY
COMMUNITY
Start: 2022-01-19

## 2022-07-12 RX ORDER — APIXABAN 2.5 MG/1
2.5 TABLET, FILM COATED ORAL 2 TIMES DAILY
COMMUNITY
Start: 2022-07-10

## 2022-07-12 NOTE — PROGRESS NOTES
[address] [place] [address] [address] [address]  Dept: [phone]  Loc: [phone]  Progress Note  7/12/2022 9:50 AM      Pt Name:    Dary Machado  YOB: 1931  Primary Care Physician:  Krys Devries MD     Chief Complaint:   Chief Complaint   Patient presents with    Follow-up     CKD III        History of Present Illness: This is a follow-up visit for CKD III. Former patient of Dr. Gracie Lo. Baseline creatinine ranges 1.5-1.8 mg/dL. He has history of kidney stones and required surgical intervention in the past.  According to previous notes there is a Remote history of proteinuria apparently was nephrotic range. This has resolved. No hx of renal biopsy. Other history includes CAD, Afib, COPD, chronic hypotension on Midodrine. Patient feels okay. BP in 46J systolic today. Denies dizziness. Metoprolol is being weaned off. Also on flomax. Takes bumex 1 mg daily. No edema. Was hospitalized few months ago for hypokalemia. Now on kcl 20 meq daily. States he will be having upcoming kidney stone procedure. Pertinent items are noted in HPI.          Past History:  Past Medical History:   Diagnosis Date    CKD (chronic kidney disease), stage III ([address]     Eczema     and rosacea    Gastric ulcer 03/03/2021    H/O bronchitis     H/O cataract     H/O transfusion of whole blood     Heart disease     Hyperlipemia     Iron deficiency anemia     Kidney stone 01/2019    MI (myocardial infarction) ([address] 09/2018    Nephrolithiasis     Nephrotic syndrome     Proteinuria     SOB (shortness of breath)      Past Surgical History:   Procedure Laterality Date    COLONOSCOPY  2003    CORONARY ANGIOPLASTY  9/18, 11/18    times 4 Dr. Manfred Long  09/2018    EGD  2021    LITHOTRIPSY      s/p extracorporeal shock wave lithotripsy        VITALS:  BP (!) 86/50 (Site: Left Upper Arm, Position: Sitting, Cuff Size: Large Adult)   Pulse 74   Wt 194 lb 11.2 oz (88.3 kg)   SpO2 98%   BMI 25.69 kg/m²   Wt Readings from Last 3 Encounters:   07/12/22 194 lb 11.2 oz (88.3 kg)   01/11/22 224 lb (101.6 kg)   10/26/21 215 lb (97.5 kg)     Body mass index is 25.69 kg/m².      General Appearance: alert and cooperative with exam, appears comfortable, no distress  HEENT: EOMI, moist oral mucus membranes  Neck: No jugular venous distention,   Lungs: Air entry B/L, no crackles or rales, no use of accessory muscles  Heart: S1, S2 heard, no rub  GI: soft, non-tender, no guarding,   Extremities: no significant edema noted  Skin: warm, dry  Neurologic: oriented x 3     Medications:  Current Outpatient Medications   Medication Sig Dispense Refill    fludrocortisone (FLORINEF) 0.1 MG tablet Take 0.1 mg by mouth 2 times daily       ELIQUIS 2.5 MG TABS tablet Take 2.5 mg by mouth 2 times daily       pantoprazole (PROTONIX) 40 MG tablet Take 40 mg by mouth daily       potassium chloride (KLOR-CON M) 20 MEQ TBCR extended release tablet Take 20 mEq by mouth once       magnesium oxide (MAG-OX) 400 (240 Mg) MG tablet Take 400 mg by mouth daily       bumetanide (BUMEX) 0.5 MG tablet Take 1 tablet by mouth daily 90 tablet 1    famotidine (PEPCID) 20 MG tablet Take 1 tablet by mouth daily 90 tablet 3    amiodarone (CORDARONE) 200 MG tablet Take 200 mg by mouth daily Mon,wed fri      aspirin 81 MG EC tablet Take 81 mg by mouth daily      CVS VITAMIN D3 250 MCG (74760 UT) CAPS capsule Take 5,000 Units by mouth daily       midodrine (PROAMATINE) 10 MG tablet Take 10 mg by mouth 3 times daily      senna (SENOKOT) 8.6 MG tablet Take 1 tablet by mouth daily      docusate sodium (COLACE) 100 MG capsule Take 100 mg by mouth daily       atorvastatin (LIPITOR) 40 MG tablet Take 40 mg by mouth daily      metoprolol tartrate (LOPRESSOR) 25 MG tablet 12.5 mg 2 times daily   0    nitroGLYCERIN (NITROSTAT) 0.4 MG SL tablet       Multiple Vitamins-Minerals (PRESERVISION AREDS PO) Take  by mouth 2 times daily. No current facility-administered medications for this visit.         Laboratory & Diagnostics:  CBC:   Lab Results   Component Value Date    WBC 7.03 01/06/2022    HGB 12.4 (A) 01/06/2022    HCT 39.9 (A) 01/06/2022    MCV 97.8 04/19/2021     01/06/2022     BMP:    Lab Results   Component Value Date     07/07/2022     01/06/2022     10/19/2021    K 3.7 07/07/2022    K 3.7 01/06/2022    K 3.9 10/19/2021     07/07/2022     01/06/2022     10/19/2021    CO2 30 07/07/2022    CO2 30 01/06/2022    CO2 25 10/19/2021    BUN 21 07/07/2022    BUN 20 01/06/2022    BUN 26 10/19/2021    CREATININE 1.9 07/07/2022    CREATININE 1.5 01/06/2022    CREATININE 1.7 10/19/2021    GLUCOSE 114 07/07/2022    GLUCOSE 100 01/06/2022    GLUCOSE 101 10/19/2021      Hepatic:   Lab Results   Component Value Date    AST 32 01/06/2022    AST 27 10/19/2021    AST 27 10/19/2021    ALT 26 01/06/2022    ALT 28 10/19/2021    ALT 28 10/19/2021    BILITOT 1.8 (A) 01/06/2022    BILITOT 1.1 10/19/2021    BILITOT 1.0 08/03/2021    ALKPHOS 126 01/06/2022    ALKPHOS 108 10/19/2021    ALKPHOS 100 08/03/2021     BNP: No results found for: BNP  Lipids:   Lab Results   Component Value Date    CHOL 124 09/28/2020    HDL 53 09/28/2020     INR: No results found for: INR  URINE: No results found for: NAUR, PROTUR  Lab Results   Component Value Date/Time    NITRU Negative 05/26/2021 12:00 AM    COLORU Yellow 05/26/2021 12:00 AM    PHUR 6.0 05/26/2021 12:00 AM    CLARITYU Clear 05/26/2021 12:00 AM    LEUKOCYTESUR Negative 05/26/2021 12:00 AM    UROBILINOGEN Normal 05/26/2021 12:00 AM    BILIRUBINUR Negative 05/26/2021 12:00 AM    BLOODU Negative 05/26/2021 12:00 AM    GLUCOSEU negative 05/26/2021 12:00 AM    KETUA  05/26/2021 12:00 AM      Comment:      trace      Microalbumen/Creatinine ratio:  No components found for: SAVANNAH        Impression/Plan:   1. CKD III: labs a little worse likely from hypotension  -will stop flomax  -reduce bumex to 0.5 mg daily, notify me if developing any swelling    2. Hypotension,on midodrine, florinef  -metoprolol being weaned off  -stop flomax  -reduce bumex as above    3. Hypokalemia, better, on kcl 20 meq daily  4. Nephrolithiasis  5. Afib  6. Diastolic CHF        Bloodwork and medications were reviewed and plan of care discussed with the patient. Return to clinic in 4 months  or sooner if the need arises.       Dane Powers,   [facility]

## 2022-12-07 LAB
BUN BLDV-MCNC: 22 MG/DL
CALCIUM SERPL-MCNC: 8.5 MG/DL
CHLORIDE BLD-SCNC: 105 MMOL/L
CO2: 29 MMOL/L
CREAT SERPL-MCNC: 1.5 MG/DL
GFR CALCULATED: 44
GLUCOSE BLD-MCNC: 71 MG/DL
POTASSIUM SERPL-SCNC: 3.9 MMOL/L
SODIUM BLD-SCNC: 143 MMOL/L

## 2022-12-08 LAB
CREATININE, URINE: 66.4
MICROALBUMIN/CREAT 24H UR: 8310 MG/G{CREAT}
MICROALBUMIN/CREAT UR-RTO: 125

## 2022-12-15 ENCOUNTER — TELEMEDICINE (OUTPATIENT)
Dept: NEPHROLOGY | Age: 87
End: 2022-12-15
Payer: MEDICARE

## 2022-12-15 DIAGNOSIS — N18.32 STAGE 3B CHRONIC KIDNEY DISEASE (HCC): Primary | ICD-10-CM

## 2022-12-15 PROCEDURE — G8484 FLU IMMUNIZE NO ADMIN: HCPCS | Performed by: INTERNAL MEDICINE

## 2022-12-15 PROCEDURE — 1123F ACP DISCUSS/DSCN MKR DOCD: CPT | Performed by: INTERNAL MEDICINE

## 2022-12-15 PROCEDURE — G8417 CALC BMI ABV UP PARAM F/U: HCPCS | Performed by: INTERNAL MEDICINE

## 2022-12-15 PROCEDURE — 99213 OFFICE O/P EST LOW 20 MIN: CPT | Performed by: INTERNAL MEDICINE

## 2022-12-15 PROCEDURE — 1036F TOBACCO NON-USER: CPT | Performed by: INTERNAL MEDICINE

## 2022-12-15 PROCEDURE — G8427 DOCREV CUR MEDS BY ELIG CLIN: HCPCS | Performed by: INTERNAL MEDICINE

## 2022-12-15 NOTE — PROGRESS NOTES
[address] [facility]  [address] [facility]  [address]  [facility] [address]  Dept: [phone]  Loc: [phone]  Progress Note  12/15/2022 2:50 PM      TELEHEALTH EVALUATION -- Audio/Visual (During HZRAA-05 public health emergency)     Telehealth service was provided with the patient at his home in his nursing home in [facility] and myself the physician in my office in [place], [place] and my assistant,  Lord Calhoun, who has initiated the visit. Pursuant to the emergency declaration under the [address] [place] [address] waiver authority and the [employer] and [employer] Act, this Virtual  Visit was conducted, with patient's consent, to reduce the patient's risk of exposure to COVID-19 and provide continuity of care for an established patient. Services were provided through a video synchronous discussion virtually to substitute for in-person clinic visit. Pt Name:    Bjorn Marinelli  YOB: 1931  Primary Care Physician:  Kaela Acharya MD     Chief Complaint:   Chief Complaint   Patient presents with    Follow-up     CKD III        History of Present Illness: This is a follow-up visit for CKD III. Former patient of Dr. Karina Do. Baseline creatinine ranges 1.5-1.8 mg/dL. He has history of kidney stones and required surgical intervention in the past.  According to previous notes there is a remote history of proteinuria apparently was nephrotic range. This has resolved. No hx of renal biopsy. Other history includes CAD, Afib, COPD, chronic hypotension on Midodrine. Patient in nursing home now. He has had multiple falls and had subdural hematoma. Was taken off his blood thinners. Daughter on phone as well. He has chronic hypotension but daughter states has not been running any lower. Denies any swelling issues or SOB. Pertinent items are noted in HPI.          Past History:  Past Medical History:   Diagnosis Date    CKD (chronic kidney disease), stage III (HCC)     Eczema     and rosacea    Gastric ulcer 03/03/2021    H/O bronchitis     H/O cataract     H/O transfusion of whole blood     Heart disease     Hyperlipemia     Iron deficiency anemia     Kidney stone 01/2019    [facility] 09/2018    Nephrolithiasis     Nephrotic syndrome     Proteinuria     SOB (shortness of breath)      Past Surgical History:   Procedure Laterality Date    COLONOSCOPY  2003    CORONARY ANGIOPLASTY  9/18, 11/18    times 4 Dr. Severino Kemp  09/2018    EGD  2021    LITHOTRIPSY      s/p extracorporeal shock wave lithotripsy        VITALS:  There were no vitals taken for this visit. Wt Readings from Last 3 Encounters:   07/12/22 194 lb 11.2 oz (88.3 kg)   01/11/22 224 lb (101.6 kg)   10/26/21 215 lb (97.5 kg)     There is no height or weight on file to calculate BMI. General -- no distress  Oral Mucosa -- moist  Neck --  JVD - no  Extremities -- no edema, moves all extremeties  CNS - awake and alert   Pschy - not agitated, mood and memory normal    Due to this being a TeleHealth encounter, evaluation of the following organ systems is limited: Vitals/EENT/Resp/CV/GI//MS/Neuro/Skin/Heme-Lymph-Imm.       Medications:  Current Outpatient Medications   Medication Sig Dispense Refill    fludrocortisone (FLORINEF) 0.1 MG tablet Take 0.1 mg by mouth 2 times daily       ELIQUIS 2.5 MG TABS tablet Take 2.5 mg by mouth 2 times daily       pantoprazole (PROTONIX) 40 MG tablet Take 40 mg by mouth daily       potassium chloride (KLOR-CON M) 20 MEQ TBCR extended release tablet Take 20 mEq by mouth once       magnesium oxide (MAG-OX) 400 (240 Mg) MG tablet Take 400 mg by mouth daily       bumetanide (BUMEX) 0.5 MG tablet Take 1 tablet by mouth daily 90 tablet 1    famotidine (PEPCID) 20 MG tablet Take 1 tablet by mouth daily 90 tablet 3    amiodarone (CORDARONE) 200 MG tablet Take 200 mg by mouth daily Mon,wed fri      aspirin 81 MG EC tablet Take 81 mg by mouth daily      CVS VITAMIN D3 250 MCG (90821 UT) CAPS capsule Take 5,000 Units by mouth daily       midodrine (PROAMATINE) 10 MG tablet Take 10 mg by mouth 3 times daily      senna (SENOKOT) 8.6 MG tablet Take 1 tablet by mouth daily      docusate sodium (COLACE) 100 MG capsule Take 100 mg by mouth daily       atorvastatin (LIPITOR) 40 MG tablet Take 40 mg by mouth daily      metoprolol tartrate (LOPRESSOR) 25 MG tablet 12.5 mg 2 times daily   0    nitroGLYCERIN (NITROSTAT) 0.4 MG SL tablet       Multiple Vitamins-Minerals (PRESERVISION AREDS PO) Take  by mouth 2 times daily. No current facility-administered medications for this visit.         Laboratory & Diagnostics:  CBC:   Lab Results   Component Value Date    WBC 7.03 01/06/2022    HGB 12.4 (A) 01/06/2022    HCT 39.9 (A) 01/06/2022    MCV 97.8 04/19/2021     01/06/2022     BMP:    Lab Results   Component Value Date     12/07/2022     07/07/2022     01/06/2022    K 3.9 12/07/2022    K 3.7 07/07/2022    K 3.7 01/06/2022     12/07/2022     07/07/2022     01/06/2022    CO2 29 12/07/2022    CO2 30 07/07/2022    CO2 30 01/06/2022    BUN 22 12/07/2022    BUN 21 07/07/2022    BUN 20 01/06/2022    CREATININE 1.5 12/07/2022    CREATININE 1.9 07/07/2022    CREATININE 1.5 01/06/2022    GLUCOSE 71 12/07/2022    GLUCOSE 114 07/07/2022    GLUCOSE 100 01/06/2022      Hepatic:   Lab Results   Component Value Date    AST 32 01/06/2022    AST 27 10/19/2021    AST 27 10/19/2021    ALT 26 01/06/2022    ALT 28 10/19/2021    ALT 28 10/19/2021    BILITOT 1.8 (A) 01/06/2022    BILITOT 1.1 10/19/2021    BILITOT 1.0 08/03/2021    ALKPHOS 126 01/06/2022    ALKPHOS 108 10/19/2021    ALKPHOS 100 08/03/2021     BNP: No results found for: BNP  Lipids:   Lab Results   Component Value Date    CHOL 124 09/28/2020    HDL 53 09/28/2020 INR: No results found for: INR  URINE: No results found for: Tavia Akbar  Lab Results   Component Value Date/Time    NITRU Negative 05/26/2021 12:00 AM    COLORU Yellow 05/26/2021 12:00 AM    PHUR 6.0 05/26/2021 12:00 AM    CLARITYU Clear 05/26/2021 12:00 AM    LEUKOCYTESUR Negative 05/26/2021 12:00 AM    UROBILINOGEN Normal 05/26/2021 12:00 AM    BILIRUBINUR Negative 05/26/2021 12:00 AM    BLOODU Negative 05/26/2021 12:00 AM    GLUCOSEU negative 05/26/2021 12:00 AM    KETUA  05/26/2021 12:00 AM      Comment:      trace      Microalbumen/Creatinine ratio:  No components found for: RUCREAT        Impression/Plan:   1. CKD III: stable  -mild proteinuria, will monitor, not a candidate for ACE or ARB given chronic hypotension    2. Hypotension,on midodrine, florinef    3. Hypokalemia, on kcl 20 meq daily  4. Nephrolithiasis  5. Afib  6. Diastolic CHF  7. Hx brain bleed        Bloodwork and medications were reviewed and plan of care discussed with the patient. Return to clinic in 1 year  or sooner if the need arises.       Juan J Ashby DO  [facility]

## 2022-12-19 RX ORDER — GLYCERIN .002; .002; .01 MG/MG; MG/MG; MG/MG
SOLUTION/ DROPS OPHTHALMIC
COMMUNITY
Start: 2022-10-18

## 2022-12-19 RX ORDER — ONDANSETRON 4 MG/1
TABLET, ORALLY DISINTEGRATING ORAL
COMMUNITY
Start: 2022-11-17

## 2022-12-19 RX ORDER — NEEDLES, FILTER 19GX1 1/2"
NEEDLE, DISPOSABLE MISCELLANEOUS
COMMUNITY
Start: 2022-11-05

## 2022-12-19 RX ORDER — BISMUTH SUBSALICYLATE 525 MG/15ML
2 SUSPENSION ORAL PRN
COMMUNITY
Start: 2022-10-26

## 2022-12-19 RX ORDER — BISACODYL 10 MG
10 SUPPOSITORY, RECTAL RECTAL DAILY PRN
COMMUNITY

## 2022-12-19 RX ORDER — POLYETHYLENE GLYCOL 3350 17 G/17G
POWDER, FOR SOLUTION ORAL
COMMUNITY
Start: 2022-10-14

## 2022-12-19 RX ORDER — ACETAMINOPHEN 325 MG/1
650 TABLET ORAL EVERY 6 HOURS PRN
COMMUNITY
Start: 2022-08-02